# Patient Record
Sex: MALE | Race: WHITE | NOT HISPANIC OR LATINO | Employment: OTHER | URBAN - METROPOLITAN AREA
[De-identification: names, ages, dates, MRNs, and addresses within clinical notes are randomized per-mention and may not be internally consistent; named-entity substitution may affect disease eponyms.]

---

## 2017-02-23 ENCOUNTER — TRANSCRIBE ORDERS (OUTPATIENT)
Dept: ADMINISTRATIVE | Facility: HOSPITAL | Age: 47
End: 2017-02-23

## 2017-02-23 DIAGNOSIS — M25.562 LEFT KNEE PAIN, UNSPECIFIED CHRONICITY: Primary | ICD-10-CM

## 2017-02-28 ENCOUNTER — HOSPITAL ENCOUNTER (OUTPATIENT)
Dept: MRI IMAGING | Facility: HOSPITAL | Age: 47
Discharge: HOME | End: 2017-02-28
Admitting: ORTHOPAEDIC SURGERY

## 2017-02-28 DIAGNOSIS — M25.562 LEFT KNEE PAIN, UNSPECIFIED CHRONICITY: ICD-10-CM

## 2017-02-28 PROCEDURE — 73721 MRI JNT OF LWR EXTRE W/O DYE: CPT

## 2017-09-20 ENCOUNTER — TELEPHONE (OUTPATIENT)
Dept: INTERNAL MEDICINE | Facility: CLINIC | Age: 47
End: 2017-09-20

## 2017-09-20 NOTE — TELEPHONE ENCOUNTER
Since this isn't emergent, can take next available PE appt slot.  Amari Vale MD  1:45 PM  09/20/17

## 2017-09-20 NOTE — TELEPHONE ENCOUNTER
S/W PT, EXPL SINCE APPT IS NOT EMERGENT AND NEED FOR PHYSICAL THAT DR PUENTES RECOMMENDS TAKING NEXT AVAILABLE PHYSICAL APPT.  SONIA GOOD UNDERSTANDING AND AGREEMENT.  APPT SCHEDULED FOR Thursday, 11/2/17 AT 2:15PM WITH DR PUENTES.  SONIA KENNEDY APPREC.

## 2017-09-20 NOTE — TELEPHONE ENCOUNTER
----- Message from Madison Paige sent at 9/20/2017 10:30 AM EDT -----  [phone]    PT WAS LAST SEEN 6/10/2013.  WOULD LIKE TO RE-ESTABLISH AND NEEDS A PHY-PT HAS A MATERNAL FAMILIAL HISTORY OF COLON CANCER-NO SYMPTOMS BUT FEELS LIKE HE PROBABLY NEEDS BASELINE COLONOSCOPY.  CAN YOU WORK IN SOONER THAN FIRST NP OPENING?

## 2017-11-02 ENCOUNTER — OFFICE VISIT (OUTPATIENT)
Dept: INTERNAL MEDICINE | Facility: CLINIC | Age: 47
End: 2017-11-02

## 2017-11-02 VITALS
HEIGHT: 76 IN | TEMPERATURE: 97.7 F | DIASTOLIC BLOOD PRESSURE: 84 MMHG | HEART RATE: 88 BPM | SYSTOLIC BLOOD PRESSURE: 120 MMHG | RESPIRATION RATE: 20 BRPM | BODY MASS INDEX: 28.56 KG/M2 | WEIGHT: 234.5 LBS

## 2017-11-02 DIAGNOSIS — Z00.00 PHYSICAL EXAM: ICD-10-CM

## 2017-11-02 DIAGNOSIS — Z80.0 FAMILY HISTORY OF COLON CANCER: ICD-10-CM

## 2017-11-02 DIAGNOSIS — Z23 IMMUNIZATION DUE: ICD-10-CM

## 2017-11-02 DIAGNOSIS — Z23 NEED FOR IMMUNIZATION AGAINST INFLUENZA: Primary | ICD-10-CM

## 2017-11-02 PROCEDURE — 90471 IMMUNIZATION ADMIN: CPT | Performed by: INTERNAL MEDICINE

## 2017-11-02 PROCEDURE — 99396 PREV VISIT EST AGE 40-64: CPT | Performed by: INTERNAL MEDICINE

## 2017-11-02 PROCEDURE — 90472 IMMUNIZATION ADMIN EACH ADD: CPT | Performed by: INTERNAL MEDICINE

## 2017-11-02 PROCEDURE — 90686 IIV4 VACC NO PRSV 0.5 ML IM: CPT | Performed by: INTERNAL MEDICINE

## 2017-11-02 PROCEDURE — 90715 TDAP VACCINE 7 YRS/> IM: CPT | Performed by: INTERNAL MEDICINE

## 2017-11-02 RX ORDER — VENLAFAXINE HYDROCHLORIDE 150 MG/1
1 CAPSULE, EXTENDED RELEASE ORAL DAILY
Refills: 5 | COMMUNITY
Start: 2017-10-16 | End: 2020-11-30

## 2017-11-02 RX ORDER — LAMOTRIGINE 25 MG/1
2 TABLET ORAL DAILY
COMMUNITY
Start: 2013-05-01 | End: 2020-11-30

## 2017-11-02 RX ORDER — OXCARBAZEPINE 300 MG/1
600 TABLET, FILM COATED ORAL NIGHTLY
Refills: 5 | COMMUNITY
Start: 2017-10-16 | End: 2020-11-30

## 2017-11-02 NOTE — PROGRESS NOTES
"Chief Complaint   Patient presents with   • Annual Exam     non fasting        History of Present Illness    The patient presents for an established patient physical examination and health maintenance visit. The patient has not had a colonoscopy. He has a family history of colon cancer in his maternal grandfather and a history of adenomatous polyps in his mother in her 40s.    Medications      Current Outpatient Prescriptions:   •  lamoTRIgine (LaMICtal) 25 MG tablet, Take 2 tablets by mouth Daily., Disp: , Rfl:   •  OXcarbazepine (TRILEPTAL) 300 MG tablet, 3 tablets Every Night., Disp: , Rfl: 5  •  venlafaxine XR (EFFEXOR-XR) 150 MG 24 hr capsule, 1 capsule Daily., Disp: , Rfl: 5     Allergies    Allergies   Allergen Reactions   • No Known Drug Allergy        Problem List    There is no problem list on file for this patient.      Medications, Allergies, Problems List and Past History were reviewed and updated.    Physical Examination    /84 (BP Location: Left arm)  Pulse 88  Temp 97.7 °F (36.5 °C) (Temporal Artery )   Resp 20  Ht 75.5\" (191.8 cm)  Wt 234 lb 8 oz (106 kg)  BMI 28.92 kg/m2    HEENT: Head- Normocephalic Atraumatic. Facies- Within normal limits. Pinnas- Normal texture and shape bilaterally. Canals- Normal bilaterally. TMs- Normal bilaterally. Nares- Patent bilaterally. Nasal Septum- is normal. There is no tenderness to palpation over the frontal or maxillary sinuses. Lids- Normal bilaterally. Conjunctiva- Clear bilaterally. Sclera- Anicteric bilaterally. Oropharynx- Moist with no lesions. Tonsils- No enlargement, erythema or exudate.    Neck: Thyroid- non enlarged, symmetric and has no nodules. No bruits are detected. ROM- Normal Range of Motion with no rigidity.    Lymph Nodes: Cervical- no enlarged lymph nodes noted. Clavicular- Deferred. Axillary- Deferred. Inguinal- Deferred.    Lungs: Auscultation- Clear to auscultation bilaterally. There are no retractions, clubbing or cyanosis. The " Expiratory to Inspiratory ratio is equal.    Cardiovascular: There are no carotid bruits. Heart- Normal Rate with Regular rhythm and no murmurs. There are no gallops. There are no rubs. In the lower extremities there is no edema. The upper extremities do not have edema.    Abdomen: Soft, benign, non-tender with no masses, hernias, organomegaly or scars.    Male Genitourinary: The penis is circumcised with testicles found in the scrotum bilaterally. Testicular Size is normal bilaterally. The penis has no anatomic abnormalities.    Rectal: reveals normal external sphincter tone with no external lesions.    Prostate: The prostate gland is symmetric and smooth with no nodularity.    The patient has no worrisome or suspicious skin lesions noted.    Impression and Assessment    Normal Physical Examination.    Family History of Colon Cancer.    Plan    Counseling was provided regarding: Dental Visits, Handwashing, Heart Healthy Diet and Seat Belt Utilization.    The following was ordered for screening and health maintenance: Cardiac Calcium CT Scan, Lipid Profile and U/A.    Counseled regarding immunizations and applicable VIS given.    Immunizations Ordered and Administered: Tdap.    Lloyd was seen today for annual exam.    Diagnoses and all orders for this visit:    Need for immunization against influenza  -     Flu Vaccine Quad PF 3YR+    Physical exam  -     Lipid Panel; Future  -     POC Urinalysis Dipstick, Automated; Future  -     CT cardiac calcium score wo dye; Future  -     Ambulatory Referral For Screening Colonoscopy    Immunization due  -     Tdap Vaccine Greater Than or Equal To 6yo IM    Family history of colon cancer  -     Ambulatory Referral For Screening Colonoscopy        Return to Office    The patient was instructed to return for follow-up in 1 year. Next Visit: Physical.    The patient was instructed to return sooner if the condition changes, worsens, or doesn't resolve.

## 2017-11-09 ENCOUNTER — LAB REQUISITION (OUTPATIENT)
Dept: LAB | Facility: HOSPITAL | Age: 47
End: 2017-11-09

## 2017-11-09 ENCOUNTER — OUTSIDE FACILITY SERVICE (OUTPATIENT)
Dept: GASTROENTEROLOGY | Facility: CLINIC | Age: 47
End: 2017-11-09

## 2017-11-09 DIAGNOSIS — Z12.11 ENCOUNTER FOR SCREENING FOR MALIGNANT NEOPLASM OF COLON: ICD-10-CM

## 2017-11-09 PROCEDURE — 88305 TISSUE EXAM BY PATHOLOGIST: CPT | Performed by: INTERNAL MEDICINE

## 2017-11-09 PROCEDURE — G0500 MOD SEDAT ENDO SERVICE >5YRS: HCPCS | Performed by: INTERNAL MEDICINE

## 2017-11-09 PROCEDURE — G0121 COLON CA SCRN NOT HI RSK IND: HCPCS | Performed by: INTERNAL MEDICINE

## 2017-11-10 LAB
CYTO UR: NORMAL
LAB AP CASE REPORT: NORMAL
LAB AP CLINICAL INFORMATION: NORMAL
Lab: NORMAL
PATH REPORT.FINAL DX SPEC: NORMAL
PATH REPORT.GROSS SPEC: NORMAL

## 2017-11-13 ENCOUNTER — TELEPHONE (OUTPATIENT)
Dept: GASTROENTEROLOGY | Facility: CLINIC | Age: 47
End: 2017-11-13

## 2017-11-13 NOTE — TELEPHONE ENCOUNTER
----- Message from Mayur Meza MD sent at 11/13/2017  7:04 AM EST -----  Please call Milton and inform him that the polyp that was removed was an adenoma or precancerous polyp.  Follow-up colonoscopy is indicated in 5 years time.  Dr. Meza

## 2018-01-10 ENCOUNTER — TELEPHONE (OUTPATIENT)
Dept: INTERNAL MEDICINE | Facility: CLINIC | Age: 48
End: 2018-01-10

## 2018-01-10 NOTE — TELEPHONE ENCOUNTER
1-10-18  S/w patient informed him regarding his fasting lab work he gave verbal understanding stated he will come in as soon as he can for that.  Lab hours were given

## 2018-01-11 ENCOUNTER — TELEPHONE (OUTPATIENT)
Dept: INTERNAL MEDICINE | Facility: CLINIC | Age: 48
End: 2018-01-11

## 2018-01-11 NOTE — TELEPHONE ENCOUNTER
----- Message from Bettye De La Vega sent at 1/10/2018  2:57 PM EST -----  This has not been schedule yet ?  ----- Message -----     From: SYSTEM     Sent: 1/10/2018  12:11 AM       To: [facility]

## 2018-01-11 NOTE — TELEPHONE ENCOUNTER
Previously we were unable to contact pt to schedule.  I was able to get ahold of him and gave him number to call back to schedule. At that time he did still want to schedule. I just called him and LVM to see if he was still wanting to get this done

## 2018-06-15 ENCOUNTER — HOSPITAL ENCOUNTER (EMERGENCY)
Facility: HOSPITAL | Age: 48
Discharge: HOME | End: 2018-06-15
Attending: EMERGENCY MEDICINE | Admitting: EMERGENCY MEDICINE

## 2018-06-15 ENCOUNTER — APPOINTMENT (OUTPATIENT)
Dept: CT IMAGING | Facility: HOSPITAL | Age: 48
End: 2018-06-15

## 2018-06-15 ENCOUNTER — APPOINTMENT (OUTPATIENT)
Dept: ULTRASOUND IMAGING | Facility: HOSPITAL | Age: 48
End: 2018-06-15

## 2018-06-15 VITALS
HEART RATE: 86 BPM | TEMPERATURE: 98.6 F | WEIGHT: 240 LBS | RESPIRATION RATE: 16 BRPM | BODY MASS INDEX: 30.8 KG/M2 | DIASTOLIC BLOOD PRESSURE: 100 MMHG | SYSTOLIC BLOOD PRESSURE: 148 MMHG | OXYGEN SATURATION: 97 % | HEIGHT: 74 IN

## 2018-06-15 DIAGNOSIS — R10.9 RIGHT SIDED ABDOMINAL PAIN: Primary | ICD-10-CM

## 2018-06-15 LAB
ALBUMIN SERPL-MCNC: 4.78 G/DL (ref 3.2–4.8)
ALBUMIN/GLOB SERPL: 1.7 G/DL (ref 1.5–2.5)
ALP SERPL-CCNC: 102 U/L (ref 25–100)
ALT SERPL W P-5'-P-CCNC: 28 U/L (ref 7–40)
ANION GAP SERPL CALCULATED.3IONS-SCNC: 6 MMOL/L (ref 3–11)
AST SERPL-CCNC: 20 U/L (ref 0–33)
BASOPHILS # BLD AUTO: 0.03 10*3/MM3 (ref 0–0.2)
BASOPHILS NFR BLD AUTO: 0.4 % (ref 0–1)
BILIRUB SERPL-MCNC: 0.7 MG/DL (ref 0.3–1.2)
BILIRUB UR QL STRIP: NEGATIVE
BUN BLD-MCNC: 21 MG/DL (ref 9–23)
BUN/CREAT SERPL: 20.4 (ref 7–25)
CALCIUM SPEC-SCNC: 9.6 MG/DL (ref 8.7–10.4)
CHLORIDE SERPL-SCNC: 103 MMOL/L (ref 99–109)
CLARITY UR: CLEAR
CO2 SERPL-SCNC: 31 MMOL/L (ref 20–31)
COLOR UR: YELLOW
CREAT BLD-MCNC: 1.03 MG/DL (ref 0.6–1.3)
DEPRECATED RDW RBC AUTO: 39.4 FL (ref 37–54)
EOSINOPHIL # BLD AUTO: 0.15 10*3/MM3 (ref 0–0.3)
EOSINOPHIL NFR BLD AUTO: 1.9 % (ref 0–3)
ERYTHROCYTE [DISTWIDTH] IN BLOOD BY AUTOMATED COUNT: 12.2 % (ref 11.3–14.5)
GFR SERPL CREATININE-BSD FRML MDRD: 78 ML/MIN/1.73
GLOBULIN UR ELPH-MCNC: 2.8 GM/DL
GLUCOSE BLD-MCNC: 107 MG/DL (ref 70–100)
GLUCOSE UR STRIP-MCNC: NEGATIVE MG/DL
HCT VFR BLD AUTO: 44.8 % (ref 38.9–50.9)
HGB BLD-MCNC: 15.6 G/DL (ref 13.1–17.5)
HGB UR QL STRIP.AUTO: NEGATIVE
HOLD SPECIMEN: NORMAL
HOLD SPECIMEN: NORMAL
IMM GRANULOCYTES # BLD: 0.01 10*3/MM3 (ref 0–0.03)
IMM GRANULOCYTES NFR BLD: 0.1 % (ref 0–0.6)
KETONES UR QL STRIP: NEGATIVE
LEUKOCYTE ESTERASE UR QL STRIP.AUTO: NEGATIVE
LIPASE SERPL-CCNC: 41 U/L (ref 6–51)
LYMPHOCYTES # BLD AUTO: 2.45 10*3/MM3 (ref 0.6–4.8)
LYMPHOCYTES NFR BLD AUTO: 30.4 % (ref 24–44)
MCH RBC QN AUTO: 30.4 PG (ref 27–31)
MCHC RBC AUTO-ENTMCNC: 34.8 G/DL (ref 32–36)
MCV RBC AUTO: 87.3 FL (ref 80–99)
MONOCYTES # BLD AUTO: 0.73 10*3/MM3 (ref 0–1)
MONOCYTES NFR BLD AUTO: 9 % (ref 0–12)
NEUTROPHILS # BLD AUTO: 4.7 10*3/MM3 (ref 1.5–8.3)
NEUTROPHILS NFR BLD AUTO: 58.2 % (ref 41–71)
NITRITE UR QL STRIP: NEGATIVE
PH UR STRIP.AUTO: 7.5 [PH] (ref 5–8)
PLATELET # BLD AUTO: 200 10*3/MM3 (ref 150–450)
PMV BLD AUTO: 9.4 FL (ref 6–12)
POTASSIUM BLD-SCNC: 3.9 MMOL/L (ref 3.5–5.5)
PROT SERPL-MCNC: 7.6 G/DL (ref 5.7–8.2)
PROT UR QL STRIP: NEGATIVE
RBC # BLD AUTO: 5.13 10*6/MM3 (ref 4.2–5.76)
SODIUM BLD-SCNC: 140 MMOL/L (ref 132–146)
SP GR UR STRIP: 1.08 (ref 1–1.03)
UROBILINOGEN UR QL STRIP: ABNORMAL
WBC NRBC COR # BLD: 8.07 10*3/MM3 (ref 3.5–10.8)
WHOLE BLOOD HOLD SPECIMEN: NORMAL
WHOLE BLOOD HOLD SPECIMEN: NORMAL

## 2018-06-15 PROCEDURE — 96360 HYDRATION IV INFUSION INIT: CPT

## 2018-06-15 PROCEDURE — 74177 CT ABD & PELVIS W/CONTRAST: CPT

## 2018-06-15 PROCEDURE — 85025 COMPLETE CBC W/AUTO DIFF WBC: CPT

## 2018-06-15 PROCEDURE — 99284 EMERGENCY DEPT VISIT MOD MDM: CPT

## 2018-06-15 PROCEDURE — 76705 ECHO EXAM OF ABDOMEN: CPT

## 2018-06-15 PROCEDURE — 25010000002 IOPAMIDOL 61 % SOLUTION: Performed by: EMERGENCY MEDICINE

## 2018-06-15 PROCEDURE — 80053 COMPREHEN METABOLIC PANEL: CPT

## 2018-06-15 PROCEDURE — 83690 ASSAY OF LIPASE: CPT

## 2018-06-15 PROCEDURE — 81003 URINALYSIS AUTO W/O SCOPE: CPT

## 2018-06-15 RX ORDER — DICYCLOMINE HCL 20 MG
20 TABLET ORAL EVERY 6 HOURS PRN
Qty: 20 TABLET | Refills: 0 | Status: SHIPPED | OUTPATIENT
Start: 2018-06-15 | End: 2019-09-03

## 2018-06-15 RX ORDER — SODIUM CHLORIDE 0.9 % (FLUSH) 0.9 %
10 SYRINGE (ML) INJECTION AS NEEDED
Status: DISCONTINUED | OUTPATIENT
Start: 2018-06-15 | End: 2018-06-15 | Stop reason: HOSPADM

## 2018-06-15 RX ADMIN — SODIUM CHLORIDE 1000 ML: 9 INJECTION, SOLUTION INTRAVENOUS at 09:11

## 2018-06-15 RX ADMIN — IOPAMIDOL 95 ML: 612 INJECTION, SOLUTION INTRAVENOUS at 09:24

## 2018-06-15 NOTE — ED PROVIDER NOTES
Subjective   Lloyd Yanez is a 47 y.o.male who presents to the ED with complaints of abdominal pain. The pt reports his pain started in his RLQ one week ago. He states it started as intermittent but has now developed into a constant pain within the last day. He claims he has been losing sleep at night due to the pain. He takes medicine for depression. He states he originally did not experience any nausea, but today he has felt nauseous. He also complains of coughing, decreased appetite, and diarrhea. He denies any dysuria or vomiting. He does not have any history of abdominal problems but he does have family history of a cholecystectomy. There are no other complaints at this time.        History provided by:  Patient  Abdominal Pain   Pain location:  RLQ  Pain quality: aching    Pain radiates to:  Does not radiate  Pain severity:  Moderate  Onset quality:  Sudden  Duration:  1 week  Timing:  Constant  Progression:  Worsening  Chronicity:  New  Context: awakening from sleep    Relieved by:  None tried  Worsened by:  Coughing  Ineffective treatments:  None tried  Associated symptoms: cough, diarrhea and nausea    Associated symptoms: no dysuria and no vomiting        Review of Systems   Constitutional: Positive for appetite change (decreased).   Respiratory: Positive for cough.    Gastrointestinal: Positive for abdominal pain (RLQ), diarrhea and nausea. Negative for vomiting.   Genitourinary: Negative for dysuria.   All other systems reviewed and are negative.      Past Medical History:   Diagnosis Date   • Depression        Allergies   Allergen Reactions   • No Known Drug Allergy        Past Surgical History:   Procedure Laterality Date   • BACK SURGERY     • TONSILLECTOMY     • UVULOPALATOPHARYNGOPLASTY         History reviewed. No pertinent family history.    Social History     Social History   • Marital status:      Social History Main Topics   • Smoking status: Never Smoker   • Smokeless tobacco: Never  Used   • Alcohol use 2.4 oz/week     4 Cans of beer per week   • Drug use: No   • Sexual activity: Defer     Other Topics Concern   • Not on file         Objective   Physical Exam   Constitutional: He is oriented to person, place, and time. He appears well-developed and well-nourished. No distress.   HENT:   Head: Normocephalic and atraumatic.   Nose: Nose normal.   Eyes: Conjunctivae are normal. No scleral icterus.   Neck: Normal range of motion. Neck supple.   Cardiovascular: Normal rate, regular rhythm and normal heart sounds.    No murmur heard.  Pulmonary/Chest: Effort normal and breath sounds normal. No respiratory distress.   Abdominal: Soft. Bowel sounds are normal. There is tenderness (mild RLQ tenderness). There is no rebound and no guarding.   Rovsing's sign present   Musculoskeletal: Normal range of motion. He exhibits no edema.   Neurological: He is alert and oriented to person, place, and time.   Skin: Skin is warm and dry.   Psychiatric: He has a normal mood and affect. His behavior is normal.   Nursing note and vitals reviewed.      Procedures         ED Course     Labs, UA, CT, US negative.  Patient stable on serial rechecks.  Discussed findings, concerns, plan of care, expected course, reasons to return and followup.                  MDM  Number of Diagnoses or Management Options  Right sided abdominal pain:      Amount and/or Complexity of Data Reviewed  Clinical lab tests: ordered and reviewed  Tests in the radiology section of CPT®: ordered and reviewed  Independent visualization of images, tracings, or specimens: yes        Final diagnoses:   Right sided abdominal pain       Documentation assistance provided by jarrod Herrera.  Information recorded by the jarrod was done at my direction and has been verified and validated by me.     Kendrick Herrera  06/15/18 0928       Kendrick Herrera  06/15/18 1212       Eleazar Bledsoe MD  06/15/18 0693

## 2018-12-13 ENCOUNTER — TRANSCRIBE ORDERS (OUTPATIENT)
Dept: ADMINISTRATIVE | Facility: HOSPITAL | Age: 48
End: 2018-12-13

## 2018-12-13 DIAGNOSIS — M25.562 LEFT KNEE PAIN, UNSPECIFIED CHRONICITY: Primary | ICD-10-CM

## 2018-12-17 ENCOUNTER — HOSPITAL ENCOUNTER (OUTPATIENT)
Dept: MRI IMAGING | Facility: HOSPITAL | Age: 48
Discharge: HOME | End: 2018-12-17
Admitting: ORTHOPAEDIC SURGERY

## 2018-12-17 DIAGNOSIS — M25.562 LEFT KNEE PAIN, UNSPECIFIED CHRONICITY: ICD-10-CM

## 2018-12-17 PROCEDURE — 73721 MRI JNT OF LWR EXTRE W/O DYE: CPT

## 2019-08-19 PROCEDURE — 85025 COMPLETE CBC W/AUTO DIFF WBC: CPT | Performed by: FAMILY MEDICINE

## 2019-08-19 PROCEDURE — 87086 URINE CULTURE/COLONY COUNT: CPT | Performed by: FAMILY MEDICINE

## 2019-08-20 ENCOUNTER — TELEPHONE (OUTPATIENT)
Dept: URGENT CARE | Facility: CLINIC | Age: 49
End: 2019-08-20

## 2019-08-21 ENCOUNTER — TELEPHONE (OUTPATIENT)
Dept: URGENT CARE | Facility: CLINIC | Age: 49
End: 2019-08-21

## 2019-08-23 ENCOUNTER — TELEPHONE (OUTPATIENT)
Dept: URGENT CARE | Facility: CLINIC | Age: 49
End: 2019-08-23

## 2019-08-28 ENCOUNTER — TELEPHONE (OUTPATIENT)
Dept: INTERNAL MEDICINE | Facility: CLINIC | Age: 49
End: 2019-08-28

## 2019-08-28 NOTE — TELEPHONE ENCOUNTER
Lloyd called and would a return call to discuss having an MRI done and seeing Neuro. He has several questions about having the MRI done and the need for a referral to Neuro.     [phone]    Thank you.

## 2019-09-03 ENCOUNTER — OFFICE VISIT (OUTPATIENT)
Dept: INTERNAL MEDICINE | Facility: CLINIC | Age: 49
End: 2019-09-03

## 2019-09-03 VITALS
WEIGHT: 239 LBS | BODY MASS INDEX: 29.87 KG/M2 | DIASTOLIC BLOOD PRESSURE: 68 MMHG | RESPIRATION RATE: 16 BRPM | HEART RATE: 84 BPM | TEMPERATURE: 98.3 F | SYSTOLIC BLOOD PRESSURE: 124 MMHG

## 2019-09-03 DIAGNOSIS — G89.29 CHRONIC MIDLINE LOW BACK PAIN WITHOUT SCIATICA: Primary | ICD-10-CM

## 2019-09-03 DIAGNOSIS — M54.50 CHRONIC MIDLINE LOW BACK PAIN WITHOUT SCIATICA: Primary | ICD-10-CM

## 2019-09-03 PROCEDURE — 99213 OFFICE O/P EST LOW 20 MIN: CPT | Performed by: INTERNAL MEDICINE

## 2019-09-03 NOTE — PROGRESS NOTES
Chief Complaint   Patient presents with   • Discuss back slippage     30 years s/p fusion T4-L1       History of Present Illness    The patient presents for an acute visit for low back pain which began two months ago. He states that the pain was first noted to come on insidiously. The pain is not associated with fever, chills, weight loss, rashes, dysuria, hematuria or a decreased urine stream. The patient denies a past history of malignancy. The pain does not radiate. There is no numbness. The patient denies loss of bladder control. The patient denies loss of bowel control. The patient has attempted no therapy to relieve the symptoms. The patient reports bending and walking aggravates the symptoms. He has a history of a T3-L1 fusion about 30 years ago.    Review of Systems    CONSTITUTIONAL- Denies Sweats, Fatigue, Weakness or Malaise.    HENT- Denies Nasal Discharge, Sore Throat, Ear Pain, Ear Drainage, Headache, Sinus Pain, Nasal Congestion, Decreased Hearing or Tinnitus.    MUSCULOSKELETAL- Reports: Decreased Range of Motion. Denies: Joint Pain, Joint Stiffness, Joint Swelling or Erythema of Joints.    Medications      Current Outpatient Medications:   •  lamoTRIgine (LaMICtal) 25 MG tablet, Take 2 tablets by mouth Daily., Disp: , Rfl:   •  OXcarbazepine (TRILEPTAL) 300 MG tablet, 600 mg Every Night., Disp: , Rfl: 5  •  venlafaxine XR (EFFEXOR-XR) 150 MG 24 hr capsule, 1 capsule Daily., Disp: , Rfl: 5     Allergies    Allergies   Allergen Reactions   • No Known Drug Allergy        Problem List    There is no problem list on file for this patient.      Medications, Allergies, Problems List and Past History were reviewed and updated.    Physical Examination    /68 (BP Location: Left arm, Patient Position: Sitting, Cuff Size: Adult)   Pulse 84   Temp 98.3 °F (36.8 °C) (Temporal)   Resp 16   Wt 108 kg (239 lb)   BMI 29.87 kg/m²     Abdomen: Soft, benign, non-tender with no masses, hernias, organomegaly or  scars.    Back: The patient has a normal spinal curvature with no evidence of scoliosis. There are no skin lesions noted on the back. Palpation reveals no tenderness and normal muscle tone. The straight leg raising test is negative. The back has normal flexion, extension, rotation, and lateral bending.    Lower Extremity Neuro Exam: Muscle Strength is 5/5 in all major lower extremity muscle groups. Deep Tendon Reflexes are 2+ and equal in the patellar and Achilles distributions bilaterally. Sensation is normal in all distributions.    Impression and Assessment    Low Back Pain.    Plan    Low Back Pain Plan: He was referred to physical therapy. Will obtain X-ray and if normal and no relief with PT, will need MRI.    Lloyd was seen today for discuss back slippage.    Diagnoses and all orders for this visit:    Chronic midline low back pain without sciatica  -     Ambulatory Referral to Physical Therapy Evaluate and treat  -     XR Spine Lumbar 2 or 3 View; Future        Return to Office    The patient was instructed to return for follow-up in 1 month.    The patient was instructed to return sooner if the condition changes, worsens, or does not resolve.

## 2019-09-25 ENCOUNTER — TELEPHONE (OUTPATIENT)
Dept: INTERNAL MEDICINE | Facility: CLINIC | Age: 49
End: 2019-09-25

## 2019-10-02 NOTE — TELEPHONE ENCOUNTER
Patient never did return phone call and I seen he had an appointment with you yesterday and canceled.  Do you want to d/c order of the x-ray or me send patient a letter?

## 2020-07-06 ENCOUNTER — OFFICE VISIT (OUTPATIENT)
Dept: ORTHOPEDIC SURGERY | Facility: CLINIC | Age: 50
End: 2020-07-06

## 2020-07-06 VITALS — OXYGEN SATURATION: 98 % | HEART RATE: 108 BPM | BODY MASS INDEX: 29.88 KG/M2 | HEIGHT: 75 IN | WEIGHT: 240.3 LBS
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
## 2020-07-06 DIAGNOSIS — M79.674 TOE PAIN, RIGHT: Primary | ICD-10-CM

## 2020-07-06 DIAGNOSIS — S92.411A CLOSED DISPLACED FRACTURE OF PROXIMAL PHALANX OF RIGHT GREAT TOE, INITIAL ENCOUNTER: ICD-10-CM

## 2020-07-06 PROCEDURE — 28490 TREAT BIG TOE FRACTURE: CPT | Performed by: PHYSICIAN ASSISTANT

## 2020-07-06 PROCEDURE — 99214 OFFICE O/P EST MOD 30 MIN: CPT | Performed by: PHYSICIAN ASSISTANT

## 2020-07-06 NOTE — PROGRESS NOTES
[facility] Clinic Note    Subjective     Chief Complaint   Patient presents with   • Right Foot - Pain     [facility] follow up 6/30/20-Nondisplaced fracture of proximal phalanx of right great toe   DOI: 6/30/2020    [facility]  Lloyd Yanez is a 49 y.o. male.  Patient presents for evaluation of his right great toe.  He was kicking a wooden board in [facility].  Toe struck the board he had instant pain to his great toe.  He was seen at urgent care on the same day and diagnosed with a proximal phalanx fracture of the great toe.  Was placed in a hard soled shoe and referred to orthopedics for further evaluation and treatment.    Currently he endorses a pain scale of 3/10.  Severity the pain moderate.  Quality pain aching, burning, throbbing.  Associated symptoms swelling.  Symptoms are worse with walking and standing.  No reported numbness or tingling into the extremity.    Patient reports he has had multiple other injuries to this toe and understands that he has arthritic changes already to the toe.  Patient is not a diabetic nor does he smoke.    Patient denies any fever, chills, night sweats or other constitutional symptoms.    Past Medical History:   Diagnosis Date   • Depression       Past Surgical History:   Procedure Laterality Date   • BACK SURGERY     • TONSILLECTOMY     • UVULOPALATOPHARYNGOPLASTY        History reviewed. No pertinent family history.  Social History     Socioeconomic History   • Marital status:      Spouse name: Not on file   • Number of children: Not on file   • Years of education: Not on file   • Highest education level: Not on file   Tobacco Use   • Smoking status: Never Smoker   • Smokeless tobacco: Never Used   Substance and Sexual Activity   • Alcohol use: Yes     Alcohol/week: 4.0 standard drinks     Types: 4 Cans of beer per week   • Drug use: No   • Sexual activity: Defer      Current Outpatient Medications on File Prior to Visit   Medication Sig Dispense Refill   •  "fluocinonide (LIDEX) 0.05 % gel      • lamoTRIgine (LaMICtal) 25 MG tablet Take 2 tablets by mouth Daily.     • OXcarbazepine (TRILEPTAL) 300 MG tablet 600 mg Every Night.  5   • venlafaxine XR (EFFEXOR-XR) 150 MG 24 hr capsule 1 capsule Daily.  5     No current facility-administered medications on file prior to visit.       Allergies   Allergen Reactions   • No Known Drug Allergy         The following portions of the patient's history were reviewed and updated as appropriate: allergies, current medications, past family history, past medical history, past social history, past surgical history and problem list.    Review of Systems   Constitutional: Negative.    HENT: Negative.    Eyes: Negative.    Respiratory: Negative.    Cardiovascular: Negative.    Gastrointestinal: Negative.    Endocrine: Negative.    Genitourinary: Negative.    Musculoskeletal: Positive for arthralgias.   Skin: Negative.    Allergic/Immunologic: Negative.    Neurological: Negative.    Hematological: Negative.    Psychiatric/Behavioral: Negative.         Objective      Physical Exam  Pulse 108   Ht 190.5 cm (75\")   Wt 109 kg (240 lb 4.8 oz)   SpO2 98%   BMI 30.04 kg/m²     Body mass index is 30.04 kg/m².    GENERAL APPEARANCE: awake, alert & oriented x 3, in no acute distress and well developed, well nourished  PSYCH: normal mood and affect  LUNGS:  breathing nonlabored, no wheezing  EYES: sclera anicteric, pupils equal  CARDIOVASCULAR: palpable pulses dorsalis pedis, palpable posterior tibial bilaterally. Capillary refill less than 2 seconds  INTEGUMENTARY: skin intact, no clubbing, cyanosis  NEUROLOGIC:  Normal Sensation         Ortho Exam  Right foot/great toe  Skin: Grossly intact without any redness or warmth.  Moderate soft tissue swelling noted throughout the toe with swelling also noted into foot.     Tenderness: Positive tenderness all along great toe especially at IPJ.  He also has pain noted at the MTP.  Motion: Dorsiflexion " plantarflexion of the ankle are grossly intact.  Patient is able to wiggle the toes.  Motor: Grossly intact TA/GS/EHL/FHL/P  Sensory: Grossly intact to light touch L2-S1.  Vascular: 2+ DP/PT with brisk capillary refill into each toe.      Imaging/Studies  Ordered right great toe plain films.  Imaging read by Dr. Short.    Imaging Results (Last 7 Days)     Procedure Component Value Units Date/Time    XR Toe 2+ View Right [[id number]] Resulted:  07/06/20 1403     Updated:  07/06/20 1405    Narrative:       Right toe radiographs  Indication: Follow-up right great toe proximal phalanx fracture  Views: AP, lateral and oblique views of the right foot    Comparison: 6/30/2020    Findings:  Displaced fracture involving the distal aspect of the proximal phalanx,   with the displacement best seen on the lateral view, with what appears to   be an old fragment at the base of the proximal phalanx abutting with an   osteophyte at the metatarsal head of the great toe, with another fragment   at the base of the distal phalanx dorsally, which may be old or new.          Assessment/Plan        ICD-10-CM ICD-9-CM   1. Toe pain, right M79.674 729.5   2. Closed displaced fracture of proximal phalanx of right great toe, initial encounter S92.411A 826.0   3. Injury while engaged in martial arts Y93.75 E008.4       Orders Placed This Encounter   Procedures   • XR Toe 2+ View Right        -Right great toe pain due to displaced fracture noted proximal phalanx secondary to karate.  -Because of the displacement noted case was discussed with Dr. Freeman who agreed that no surgical intervention is warranted at this time.    -Continue with hard soled postop shoe.  -Patient to continue with over-the-counter pain medications as needed.  -Ice and elevation for swelling control.  -Follow-up 3 weeks for repeat evaluation to include repeat imaging of the right great toe.  -Questions and concerns answered.    Patient was also examined by Dr. Short and he  agrees with the above assessment and plan.    Medical Decision Making  Management Options : over-the-counter medicine and close treatment of fracture or dislocation  Data/Risk: radiology tests       Griselda Elam PA-C  07/07/20  11:54         EMR Dragon/Transcription disclaimer:  Much of this encounter note is an electronic transcription of spoken language to printed text. Electronic transcription of spoken language may permit erroneous, or at times, nonsensical words or phrases to be inadvertently transcribed. Although I have reviewed the note for such errors, some may still exist.

## 2020-07-27 ENCOUNTER — OFFICE VISIT (OUTPATIENT)
Dept: ORTHOPEDIC SURGERY | Facility: CLINIC | Age: 50
End: 2020-07-27

## 2020-07-27 VITALS — HEART RATE: 113 BPM | HEIGHT: 75 IN | WEIGHT: 240.3 LBS | BODY MASS INDEX: 29.88 KG/M2 | OXYGEN SATURATION: 97 %

## 2020-07-27 DIAGNOSIS — S92.411D CLOSED DISPLACED FRACTURE OF PROXIMAL PHALANX OF RIGHT GREAT TOE WITH ROUTINE HEALING, SUBSEQUENT ENCOUNTER: Primary | ICD-10-CM

## 2020-07-27 PROCEDURE — 99024 POSTOP FOLLOW-UP VISIT: CPT | Performed by: PHYSICIAN ASSISTANT

## 2020-07-27 NOTE — PROGRESS NOTES
[facility] [place] [facility] Note    Subjective     Chief Complaint   Patient presents with   • Follow-up     3 week recheck - Closed displaced fracture of proximal phalanx of right great toe-    DOI: 6/30/2020        HPI  Lloyd Yanez is a 49 y.o. male.  Patient returns today for follow-up right great toe fracture.  Is been wearing hard soled postop shoe since his last evaluation.  He continues to have pain to the toe with pain scale of 3/10.  Still notes swelling.  Symptoms are worse with walking and standing.  No reported numbness or tingling although occasionally does feel a burning sensation.    Patient denies any fever, chills, night sweats or other constitutional symptoms    Past Medical History:   Diagnosis Date   • Depression       Past Surgical History:   Procedure Laterality Date   • BACK SURGERY     • TONSILLECTOMY     • UVULOPALATOPHARYNGOPLASTY        History reviewed. No pertinent family history.  Social History     Socioeconomic History   • Marital status:      Spouse name: Not on file   • Number of children: Not on file   • Years of education: Not on file   • Highest education level: Not on file   Tobacco Use   • Smoking status: Never Smoker   • Smokeless tobacco: Never Used   Substance and Sexual Activity   • Alcohol use: Yes     Alcohol/week: 4.0 standard drinks     Types: 4 Cans of beer per week   • Drug use: No   • Sexual activity: Defer      Current Outpatient Medications on File Prior to Visit   Medication Sig Dispense Refill   • fluocinonide (LIDEX) 0.05 % gel      • lamoTRIgine (LaMICtal) 25 MG tablet Take 2 tablets by mouth Daily.     • OXcarbazepine (TRILEPTAL) 300 MG tablet 600 mg Every Night.  5   • venlafaxine XR (EFFEXOR-XR) 150 MG 24 hr capsule 1 capsule Daily.  5     No current facility-administered medications on file prior to visit.       Allergies   Allergen Reactions   • No Known Drug Allergy         The following portions of the patient's history were reviewed and  "updated as appropriate: allergies, current medications, past family history, past medical history, past social history, past surgical history and problem list.    Review of Systems   Constitutional: Negative.    HENT: Negative.    Eyes: Negative.    Respiratory: Negative.    Cardiovascular: Negative.    Gastrointestinal: Negative.    Endocrine: Negative.    Genitourinary: Negative.    Musculoskeletal: Positive for arthralgias and joint swelling.   Skin: Negative.    Allergic/Immunologic: Negative.    Neurological: Negative.    Hematological: Negative.    Psychiatric/Behavioral: Negative.         Objective      Physical Exam  Pulse 113   Ht 190.5 cm (75\")   Wt 109 kg (240 lb 4.8 oz)   SpO2 97%   BMI 30.04 kg/m²     Body mass index is 30.04 kg/m².    GENERAL APPEARANCE: awake, alert & oriented x 3, in no acute distress and well developed, well nourished        Ortho Exam  Right great toe  Skin: Grossly intact without any redness or warmth.    Patient continues to have soft tissue swelling noted throughout the great toe.  Swelling that was previously noted in the foot has resolved.    Tenderness: Continued positive tenderness great toe.  No other tenderness throughout foot.  Motion: Dorsiflexion and plantarflexion of the ankle are grossly intact.    Patient is able to demonstrate flexion extension of the great toe as well as lesser toes.  Motor/sensory: Grossly intact L2-S1.  Vascular: 2+ DP/PT.      Imaging/Studies  Ordered right great toe plain films.  Imaging read by Dr. Short.    Right Toe Radiographs  Indication: Fracture, right great toe proximal phalanx, follow-up  Views: AP, lateral and oblique views of the right foot     Comparison: 7/6/2020     Findings:  Intra-articular fracture of the distal end of the proximal phalanx is unchanged compared the previous films, with dorsal displacement, but no change in the articular surface.    Assessment/Plan        ICD-10-CM ICD-9-CM   1. Closed displaced fracture of " proximal phalanx of right great toe with routine healing, subsequent encounter S92.411D V54.19       No orders of the defined types were placed in this encounter.       -Displaced fracture proximal phalanx right great toe, stable when compared to prior films from 7/6/2020.  Evidence of healing noted.  -Patient will continue with hard soled postop shoe in the next 2 to 4 weeks transition to a regular shoe as able.  -Continue with over-the-counter pain medication as needed.  -Continue with elevation for swelling control.  -Follow-up 4 weeks for repeat evaluation which will include pre-clinic imaging of the right great toe.  -Questions and concerns answered.    Case discussed with Dr. Short who agrees with the above assessment and plan.      Griselda Elam PA-C  07/27/20  12:43         EMR Dragon/Transcription disclaimer:  Much of this encounter note is an electronic transcription of spoken language to printed text. Electronic transcription of spoken language may permit erroneous, or at times, nonsensical words or phrases to be inadvertently transcribed. Although I have reviewed the note for such errors, some may still exist.

## 2020-08-31 ENCOUNTER — OFFICE VISIT (OUTPATIENT)
Dept: ORTHOPEDIC SURGERY | Facility: CLINIC | Age: 50
End: 2020-08-31

## 2020-08-31 VITALS — HEART RATE: 98 BPM | HEIGHT: 75 IN | WEIGHT: 240.3 LBS | OXYGEN SATURATION: 98 % | BODY MASS INDEX: 29.88 KG/M2

## 2020-08-31 DIAGNOSIS — S92.411D CLOSED DISPLACED FRACTURE OF PROXIMAL PHALANX OF RIGHT GREAT TOE WITH ROUTINE HEALING, SUBSEQUENT ENCOUNTER: Primary | ICD-10-CM

## 2020-08-31 PROCEDURE — 99024 POSTOP FOLLOW-UP VISIT: CPT | Performed by: PHYSICIAN ASSISTANT

## 2020-09-21 ENCOUNTER — HOSPITAL ENCOUNTER (EMERGENCY)
Facility: HOSPITAL | Age: 50
Discharge: HOME | End: 2020-09-21
Attending: EMERGENCY MEDICINE | Admitting: EMERGENCY MEDICINE

## 2020-09-21 ENCOUNTER — APPOINTMENT (OUTPATIENT)
Dept: CT IMAGING | Facility: HOSPITAL | Age: 50
End: 2020-09-21

## 2020-09-21 VITALS
RESPIRATION RATE: 20 BRPM | OXYGEN SATURATION: 97 % | BODY MASS INDEX: 29.84 KG/M2 | TEMPERATURE: 97.5 F | DIASTOLIC BLOOD PRESSURE: 93 MMHG | HEIGHT: 75 IN | HEART RATE: 90 BPM | WEIGHT: 240 LBS | SYSTOLIC BLOOD PRESSURE: 174 MMHG
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
## 2020-09-21 DIAGNOSIS — S20.211A CONTUSION OF RIGHT CHEST WALL, INITIAL ENCOUNTER: ICD-10-CM

## 2020-09-21 DIAGNOSIS — M54.9 MID BACK PAIN: ICD-10-CM

## 2020-09-21 LAB
ALBUMIN SERPL-MCNC: 4.8 G/DL (ref 3.5–5.2)
ALBUMIN/GLOB SERPL: 2.2 G/DL
ALP SERPL-CCNC: 104 U/L (ref 39–117)
ALT SERPL W P-5'-P-CCNC: 28 U/L (ref 1–41)
ANION GAP SERPL CALCULATED.3IONS-SCNC: 6 MMOL/L (ref 5–15)
AST SERPL-CCNC: 22 U/L (ref 1–40)
BASOPHILS # BLD AUTO: 0.06 10*3/MM3 (ref 0–0.2)
BASOPHILS NFR BLD AUTO: 0.8 % (ref 0–1.5)
BILIRUB SERPL-MCNC: 0.4 MG/DL (ref 0–1.2)
BILIRUB UR QL STRIP: NEGATIVE
BUN SERPL-MCNC: 20 MG/DL (ref 6–20)
BUN/CREAT SERPL: 19 (ref 7–25)
CALCIUM SPEC-SCNC: 9.3 MG/DL (ref 8.6–10.5)
CHLORIDE SERPL-SCNC: 102 MMOL/L (ref 98–107)
CLARITY UR: CLEAR
CO2 SERPL-SCNC: 31 MMOL/L (ref 22–29)
COLOR UR: YELLOW
CREAT SERPL-MCNC: 1.05 MG/DL (ref 0.76–1.27)
DEPRECATED RDW RBC AUTO: 38 FL (ref 37–54)
EOSINOPHIL # BLD AUTO: 0.18 10*3/MM3 (ref 0–0.4)
EOSINOPHIL NFR BLD AUTO: 2.5 % (ref 0.3–6.2)
ERYTHROCYTE [DISTWIDTH] IN BLOOD BY AUTOMATED COUNT: 11.9 % (ref 12.3–15.4)
GFR SERPL CREATININE-BSD FRML MDRD: 75 ML/MIN/1.73
GLOBULIN UR ELPH-MCNC: 2.2 GM/DL
GLUCOSE SERPL-MCNC: 89 MG/DL (ref 65–99)
GLUCOSE UR STRIP-MCNC: NEGATIVE MG/DL
HCT VFR BLD AUTO: 45.6 % (ref 37.5–51)
HGB BLD-MCNC: 15.6 G/DL (ref 13–17.7)
HGB UR QL STRIP.AUTO: NEGATIVE
IMM GRANULOCYTES # BLD AUTO: 0.03 10*3/MM3 (ref 0–0.05)
IMM GRANULOCYTES NFR BLD AUTO: 0.4 % (ref 0–0.5)
KETONES UR QL STRIP: NEGATIVE
LEUKOCYTE ESTERASE UR QL STRIP.AUTO: NEGATIVE
LYMPHOCYTES # BLD AUTO: 2.01 10*3/MM3 (ref 0.7–3.1)
LYMPHOCYTES NFR BLD AUTO: 27.8 % (ref 19.6–45.3)
MCH RBC QN AUTO: 30.1 PG (ref 26.6–33)
MCHC RBC AUTO-ENTMCNC: 34.2 G/DL (ref 31.5–35.7)
MCV RBC AUTO: 87.9 FL (ref 79–97)
MONOCYTES # BLD AUTO: 0.69 10*3/MM3 (ref 0.1–0.9)
MONOCYTES NFR BLD AUTO: 9.5 % (ref 5–12)
NEUTROPHILS NFR BLD AUTO: 4.26 10*3/MM3 (ref 1.7–7)
NEUTROPHILS NFR BLD AUTO: 59 % (ref 42.7–76)
NITRITE UR QL STRIP: NEGATIVE
NRBC BLD AUTO-RTO: 0 /100 WBC (ref 0–0.2)
PH UR STRIP.AUTO: 7.5 [PH] (ref 5–8)
PLATELET # BLD AUTO: 244 10*3/MM3 (ref 140–450)
PMV BLD AUTO: 9.6 FL (ref 6–12)
POTASSIUM SERPL-SCNC: 4.5 MMOL/L (ref 3.5–5.2)
PROT SERPL-MCNC: 7 G/DL (ref 6–8.5)
PROT UR QL STRIP: NEGATIVE
RBC # BLD AUTO: 5.19 10*6/MM3 (ref 4.14–5.8)
SODIUM SERPL-SCNC: 139 MMOL/L (ref 136–145)
SP GR UR STRIP: 1.02 (ref 1–1.03)
UROBILINOGEN UR QL STRIP: NORMAL
WBC # BLD AUTO: 7.23 10*3/MM3 (ref 3.4–10.8)

## 2020-09-21 PROCEDURE — 25010000002 IOPAMIDOL 61 % SOLUTION: Performed by: EMERGENCY MEDICINE

## 2020-09-21 PROCEDURE — 72128 CT CHEST SPINE W/O DYE: CPT

## 2020-09-21 PROCEDURE — 96374 THER/PROPH/DIAG INJ IV PUSH: CPT

## 2020-09-21 PROCEDURE — 81003 URINALYSIS AUTO W/O SCOPE: CPT | Performed by: PHYSICIAN ASSISTANT

## 2020-09-21 PROCEDURE — 99283 EMERGENCY DEPT VISIT LOW MDM: CPT

## 2020-09-21 PROCEDURE — 85025 COMPLETE CBC W/AUTO DIFF WBC: CPT | Performed by: PHYSICIAN ASSISTANT

## 2020-09-21 PROCEDURE — 25010000002 KETOROLAC TROMETHAMINE PER 15 MG: Performed by: PHYSICIAN ASSISTANT

## 2020-09-21 PROCEDURE — 80053 COMPREHEN METABOLIC PANEL: CPT | Performed by: PHYSICIAN ASSISTANT

## 2020-09-21 PROCEDURE — 71260 CT THORAX DX C+: CPT

## 2020-09-21 RX ORDER — ONDANSETRON 2 MG/ML
4 INJECTION INTRAMUSCULAR; INTRAVENOUS ONCE
Status: DISCONTINUED | OUTPATIENT
Start: 2020-09-21 | End: 2020-09-21 | Stop reason: HOSPADM

## 2020-09-21 RX ORDER — SODIUM CHLORIDE 0.9 % (FLUSH) 0.9 %
10 SYRINGE (ML) INJECTION AS NEEDED
Status: DISCONTINUED | OUTPATIENT
Start: 2020-09-21 | End: 2020-09-21 | Stop reason: HOSPADM

## 2020-09-21 RX ORDER — KETOROLAC TROMETHAMINE 15 MG/ML
15 INJECTION, SOLUTION INTRAMUSCULAR; INTRAVENOUS ONCE
Status: COMPLETED | OUTPATIENT
Start: 2020-09-21 | End: 2020-09-21

## 2020-09-21 RX ORDER — SODIUM CHLORIDE 9 MG/ML
125 INJECTION, SOLUTION INTRAVENOUS CONTINUOUS
Status: DISCONTINUED | OUTPATIENT
Start: 2020-09-21 | End: 2020-09-21 | Stop reason: HOSPADM

## 2020-09-21 RX ORDER — KETOROLAC TROMETHAMINE 10 MG/1
10 TABLET, FILM COATED ORAL EVERY 6 HOURS PRN
Qty: 24 TABLET | Refills: 0 | Status: SHIPPED | OUTPATIENT
Start: 2020-09-21 | End: 2020-11-02

## 2020-09-21 RX ADMIN — IOPAMIDOL 95 ML: 612 INJECTION, SOLUTION INTRAVENOUS at 15:10

## 2020-09-21 RX ADMIN — KETOROLAC TROMETHAMINE 15 MG: 15 INJECTION, SOLUTION INTRAMUSCULAR; INTRAVENOUS at 16:18

## 2020-11-02 ENCOUNTER — OFFICE VISIT (OUTPATIENT)
Dept: ORTHOPEDIC SURGERY | Facility: CLINIC | Age: 50
End: 2020-11-02

## 2020-11-02 VITALS — OXYGEN SATURATION: 97 % | BODY MASS INDEX: 30.09 KG/M2 | HEART RATE: 96 BPM | WEIGHT: 242 LBS | HEIGHT: 75 IN

## 2020-11-02 DIAGNOSIS — S92.411D CLOSED DISPLACED FRACTURE OF PROXIMAL PHALANX OF RIGHT GREAT TOE WITH ROUTINE HEALING, SUBSEQUENT ENCOUNTER: ICD-10-CM

## 2020-11-02 DIAGNOSIS — Z09 FRACTURE FOLLOW-UP: Primary | ICD-10-CM

## 2020-11-02 PROCEDURE — 99212 OFFICE O/P EST SF 10 MIN: CPT | Performed by: PHYSICIAN ASSISTANT

## 2020-11-02 NOTE — PROGRESS NOTES
"    [facility] [place] [facility] Note        Subjective     CC: Follow-up (2 month follow up -Closed displaced fracture of proximal phalanx of right great toe with routine healing, 4 months from DOI: 6/30/2020)      KHUSHI Yanez is a 50 y.o. male.  Patient returns today for follow-up right great toe fracture he is approximately 4 months out from date of injury.  No new complaints or issues.  He still notes some mild discomfort to the toe as well as stiffness and swelling.  Pain scale remains 0-1/10 depending on activity.    Overall, patient's symptoms are improved, stable.    ROS:    Constiutional:Pt denies fever, chills, nausea, or vomiting.  MSK:as above        Objective      Past Medical History  Past Medical History:   Diagnosis Date   • Depression          Physical Exam  Pulse 96   Ht 190.5 cm (75\")   Wt 110 kg (242 lb)   SpO2 97%   BMI 30.25 kg/m²     Body mass index is 30.25 kg/m².    Patient is well nourished and well developed.        Ortho Exam  Right great toe  Skin: Grossly intact without any redness or warmth.  Patient still has swelling noted to the great toe.    Tenderness: Palpable discomfort noted throughout the toe.  Motion: Able to demonstrate flexion/extension of the great toe with evidence of stiffness.  Motor/sensory: Grossly intact L2-S1.  Vascular: 2+ DP/PT.       Imaging/Labs/EMG Reviewed:  Ordered right great toe plain films.  Imaging read by Dr. Short.    Right Toe Radiographs  Indication: right foot pain  Views: AP, lateral and oblique views of right great toe     Comparison: 8/31/2020     Findings:  Fracture at the distal end of the proximal phalanx of the great toe appears to be healing, with no unusual bony features.      Assessment:  1. Fracture follow-up    2. Closed displaced fracture of proximal phalanx of right great toe with routine healing, subsequent encounter        Plan:  1. Right great toe fracture remains stable with continued healing noted.  2. Patient may " continue advancing activities as tolerated.  3. Still recommend use of compression socks for swelling control.  4. For now he will follow-up as needed.  5. Questions and concerns answered.      Griselda Elam PA-C  11/05/20  14:36 MARIA T Rangel disclaimer:  Much of this encounter note is an electronic transcription/translation of spoken language to printed text. The electronic translation of spoken language may permit erroneous, or at times, nonsensical words or phrases to be inadvertently transcribed; Although I have reviewed the note for such errors, some may still exist.

## 2021-04-12 ENCOUNTER — DOCUMENTATION (OUTPATIENT)
Dept: INTERNAL MEDICINE | Facility: HOSPITAL | Age: 51
End: 2021-04-12

## 2021-04-12 RX ORDER — TELMISARTAN 40 MG/1
40 TABLET ORAL DAILY
Status: CANCELLED
Start: 2021-04-12

## 2021-05-12 ENCOUNTER — DOCUMENTATION (OUTPATIENT)
Dept: INTERNAL MEDICINE | Facility: HOSPITAL | Age: 51
End: 2021-05-12

## 2021-05-12 RX ORDER — TELMISARTAN 40 MG/1
40 TABLET ORAL DAILY
Qty: 90 TABLET | Refills: 3 | Status: SHIPPED | OUTPATIENT
Start: 2021-05-12 | End: 2022-05-19 | Stop reason: SDUPTHER

## 2022-01-02 PROCEDURE — U0004 COV-19 TEST NON-CDC HGH THRU: HCPCS | Performed by: FAMILY MEDICINE

## 2022-05-19 ENCOUNTER — APPOINTMENT (OUTPATIENT)
Dept: GENERAL RADIOLOGY | Facility: HOSPITAL | Age: 52
End: 2022-05-19

## 2022-05-19 ENCOUNTER — LAB (OUTPATIENT)
Dept: LAB | Facility: HOSPITAL | Age: 52
End: 2022-05-19

## 2022-05-19 ENCOUNTER — OFFICE VISIT (OUTPATIENT)
Dept: INTERNAL MEDICINE | Facility: CLINIC | Age: 52
End: 2022-05-19

## 2022-05-19 VITALS
TEMPERATURE: 97.3 F | WEIGHT: 242.8 LBS | SYSTOLIC BLOOD PRESSURE: 126 MMHG | HEIGHT: 74 IN | HEART RATE: 80 BPM | DIASTOLIC BLOOD PRESSURE: 78 MMHG | BODY MASS INDEX: 31.16 KG/M2 | RESPIRATION RATE: 12 BRPM

## 2022-05-19 DIAGNOSIS — Z12.5 PROSTATE CANCER SCREENING: ICD-10-CM

## 2022-05-19 DIAGNOSIS — Z13.220 SCREENING FOR LIPID DISORDERS: ICD-10-CM

## 2022-05-19 DIAGNOSIS — Z23 IMMUNIZATION DUE: ICD-10-CM

## 2022-05-19 DIAGNOSIS — G89.29 CHRONIC MIDLINE LOW BACK PAIN WITHOUT SCIATICA: ICD-10-CM

## 2022-05-19 DIAGNOSIS — K40.90 INGUINAL HERNIA OF RIGHT SIDE WITHOUT OBSTRUCTION OR GANGRENE: ICD-10-CM

## 2022-05-19 DIAGNOSIS — Z12.11 SCREENING FOR COLON CANCER: ICD-10-CM

## 2022-05-19 DIAGNOSIS — Z00.00 PHYSICAL EXAM: Primary | ICD-10-CM

## 2022-05-19 DIAGNOSIS — M54.50 CHRONIC MIDLINE LOW BACK PAIN WITHOUT SCIATICA: ICD-10-CM

## 2022-05-19 LAB
ALBUMIN SERPL-MCNC: 5 G/DL (ref 3.5–5.2)
ALBUMIN/GLOB SERPL: 2.5 G/DL
ALP SERPL-CCNC: 83 U/L (ref 39–117)
ALT SERPL W P-5'-P-CCNC: 24 U/L (ref 1–41)
ANION GAP SERPL CALCULATED.3IONS-SCNC: 10.6 MMOL/L (ref 5–15)
AST SERPL-CCNC: 24 U/L (ref 1–40)
BASOPHILS # BLD AUTO: 0.07 10*3/MM3 (ref 0–0.2)
BASOPHILS NFR BLD AUTO: 1.1 % (ref 0–1.5)
BILIRUB BLD-MCNC: NEGATIVE MG/DL
BILIRUB SERPL-MCNC: 0.5 MG/DL (ref 0–1.2)
BUN SERPL-MCNC: 26 MG/DL (ref 6–20)
BUN/CREAT SERPL: 29.5 (ref 7–25)
CALCIUM SPEC-SCNC: 9.4 MG/DL (ref 8.6–10.5)
CHLORIDE SERPL-SCNC: 103 MMOL/L (ref 98–107)
CHOLEST SERPL-MCNC: 191 MG/DL (ref 0–200)
CLARITY, POC: CLEAR
CO2 SERPL-SCNC: 25.4 MMOL/L (ref 22–29)
COLOR UR: YELLOW
CREAT SERPL-MCNC: 0.88 MG/DL (ref 0.76–1.27)
DEPRECATED RDW RBC AUTO: 38.7 FL (ref 37–54)
EGFRCR SERPLBLD CKD-EPI 2021: 104.8 ML/MIN/1.73
EOSINOPHIL # BLD AUTO: 0.26 10*3/MM3 (ref 0–0.4)
EOSINOPHIL NFR BLD AUTO: 4.2 % (ref 0.3–6.2)
ERYTHROCYTE [DISTWIDTH] IN BLOOD BY AUTOMATED COUNT: 12 % (ref 12.3–15.4)
EXPIRATION DATE: NORMAL
GLOBULIN UR ELPH-MCNC: 2 GM/DL
GLUCOSE SERPL-MCNC: 108 MG/DL (ref 65–99)
GLUCOSE UR STRIP-MCNC: NEGATIVE MG/DL
HBA1C MFR BLD: 5.7 % (ref 4.8–5.6)
HCT VFR BLD AUTO: 44.6 % (ref 37.5–51)
HCV AB SER DONR QL: NORMAL
HDLC SERPL-MCNC: 37 MG/DL (ref 40–60)
HGB BLD-MCNC: 15.5 G/DL (ref 13–17.7)
IMM GRANULOCYTES # BLD AUTO: 0.01 10*3/MM3 (ref 0–0.05)
IMM GRANULOCYTES NFR BLD AUTO: 0.2 % (ref 0–0.5)
KETONES UR QL: NEGATIVE
LDLC SERPL CALC-MCNC: 137 MG/DL (ref 0–100)
LDLC/HDLC SERPL: 3.66 {RATIO}
LEUKOCYTE EST, POC: NEGATIVE
LYMPHOCYTES # BLD AUTO: 1.82 10*3/MM3 (ref 0.7–3.1)
LYMPHOCYTES NFR BLD AUTO: 29.4 % (ref 19.6–45.3)
Lab: NORMAL
MCH RBC QN AUTO: 30.5 PG (ref 26.6–33)
MCHC RBC AUTO-ENTMCNC: 34.8 G/DL (ref 31.5–35.7)
MCV RBC AUTO: 87.8 FL (ref 79–97)
MONOCYTES # BLD AUTO: 0.56 10*3/MM3 (ref 0.1–0.9)
MONOCYTES NFR BLD AUTO: 9.1 % (ref 5–12)
NEUTROPHILS NFR BLD AUTO: 3.46 10*3/MM3 (ref 1.7–7)
NEUTROPHILS NFR BLD AUTO: 56 % (ref 42.7–76)
NITRITE UR-MCNC: NEGATIVE MG/ML
NRBC BLD AUTO-RTO: 0 /100 WBC (ref 0–0.2)
PH UR: 6 [PH] (ref 5–8)
PLATELET # BLD AUTO: 226 10*3/MM3 (ref 140–450)
PMV BLD AUTO: 9.9 FL (ref 6–12)
POTASSIUM SERPL-SCNC: 3.9 MMOL/L (ref 3.5–5.2)
PROT SERPL-MCNC: 7 G/DL (ref 6–8.5)
PROT UR STRIP-MCNC: NEGATIVE MG/DL
PSA SERPL-MCNC: 1.08 NG/ML (ref 0–4)
RBC # BLD AUTO: 5.08 10*6/MM3 (ref 4.14–5.8)
RBC # UR STRIP: NEGATIVE /UL
SODIUM SERPL-SCNC: 139 MMOL/L (ref 136–145)
SP GR UR: 1.02 (ref 1–1.03)
TRIGL SERPL-MCNC: 93 MG/DL (ref 0–150)
TSH SERPL DL<=0.05 MIU/L-ACNC: 2.1 UIU/ML (ref 0.27–4.2)
UROBILINOGEN UR QL: NORMAL
VLDLC SERPL-MCNC: 17 MG/DL (ref 5–40)
WBC NRBC COR # BLD: 6.18 10*3/MM3 (ref 3.4–10.8)

## 2022-05-19 PROCEDURE — 80053 COMPREHEN METABOLIC PANEL: CPT | Performed by: INTERNAL MEDICINE

## 2022-05-19 PROCEDURE — 85025 COMPLETE CBC W/AUTO DIFF WBC: CPT | Performed by: INTERNAL MEDICINE

## 2022-05-19 PROCEDURE — 86803 HEPATITIS C AB TEST: CPT | Performed by: INTERNAL MEDICINE

## 2022-05-19 PROCEDURE — G0103 PSA SCREENING: HCPCS | Performed by: INTERNAL MEDICINE

## 2022-05-19 PROCEDURE — 81003 URINALYSIS AUTO W/O SCOPE: CPT | Performed by: INTERNAL MEDICINE

## 2022-05-19 PROCEDURE — 99396 PREV VISIT EST AGE 40-64: CPT | Performed by: INTERNAL MEDICINE

## 2022-05-19 PROCEDURE — 83036 HEMOGLOBIN GLYCOSYLATED A1C: CPT | Performed by: INTERNAL MEDICINE

## 2022-05-19 PROCEDURE — 84443 ASSAY THYROID STIM HORMONE: CPT | Performed by: INTERNAL MEDICINE

## 2022-05-19 PROCEDURE — 91305 COVID-19 (PFIZER) 12+ YRS: CPT | Performed by: INTERNAL MEDICINE

## 2022-05-19 PROCEDURE — 0054A COVID-19 (PFIZER) 12+ YRS: CPT | Performed by: INTERNAL MEDICINE

## 2022-05-19 PROCEDURE — 80061 LIPID PANEL: CPT | Performed by: INTERNAL MEDICINE

## 2022-05-19 RX ORDER — LAMOTRIGINE 25 MG/1
2 TABLET ORAL NIGHTLY
COMMUNITY
Start: 2022-05-18

## 2022-05-19 RX ORDER — VENLAFAXINE HYDROCHLORIDE 150 MG/1
150 CAPSULE, EXTENDED RELEASE ORAL EVERY MORNING
COMMUNITY
Start: 2022-04-15 | End: 2022-10-03 | Stop reason: DRUGHIGH

## 2022-05-19 RX ORDER — TELMISARTAN 40 MG/1
40 TABLET ORAL DAILY
COMMUNITY

## 2022-05-19 RX ORDER — OXCARBAZEPINE 600 MG/1
600 TABLET, FILM COATED ORAL
COMMUNITY
Start: 2022-04-15

## 2022-05-19 NOTE — PROGRESS NOTES
Chief Complaint   Patient presents with   • Annual Exam       History of Present Illness    The patient presents for an established patient physical examination and health maintenance visit.    The patient presents for a follow-up related to low back pain which has been a chronic problem. He states that the pain was first noted upon awakening from sleep. The pain was present when the patient awakened from sleep. There was no trauma prior to the start of the pain. The pain is not associated with fever, chills, weight loss, rashes, dysuria, hematuria or a decreased urine stream. The patient denies a past history of malignancy. The pain does not radiate. There is no numbness. The patient denies loss of bladder control. The patient denies loss of bowel control. The patient reports no aggravating factors.    Review of Systems    CONSTITUTIONAL- Denies Sweats, Fatigue, Weakness or Malaise.    NECK- Denies Decreased Range of Motion, Stiffness, Thyroid Nodules, Enlarged Lymph Nodes or Goiter.    EYES- Denies Eye Pain, Eye Drainage, Eye Redness, Eye Discharge, Decreased Vision, Visual Disturbance, Diplopia, Visual Loss or Swollen Eyelid.    HENT- Denies Nasal Discharge, Sore Throat, Ear Pain, Ear Drainage, Headache, Sinus Pain, Nasal Congestion, Decreased Hearing or Tinnitus.    PULMONARY- Denies Wheezing, Dyspnea, Sputum Production, Cough, Hemoptysis or Pleuritic Chest Pain.    GASTROINTESTINAL- Denies Abdominal Pain, Nausea, Vomiting, Diarrhea, Blood per Rectum, Constipation or Heartburn.    GENITOURINARY- Denies Penile Discharge, Erectile Dysfunction, Testicular Mass, Testicular Pain, Hernia, Nocturia, Incomplete Voiding, Urinary Frequency or Urinary Urgency.    CARDIOVASCULAR- Denies Chest Pain, Claudication, Edema, Syncope, Palpitations or Irregular Heart Beat.    ENDOCRINE- Denies Cold Intolerance, Depression, Hair Loss, Heat Intolerance, Memory Loss, Polydypsia, Polyphagia, Polyuria, Sleep Disturbance or Weight  "Gain.    NEUROLOGIC- Denies Seizures, Visual Changes, Confusion or Excessive Daytime Sleepiness.    MUSCULOSKELETAL- Denies Joint Pain, Joint Stiffness, Decreased Range of Motion, Joint Swelling or Erythema of Joints.    INTEGUMENTARY- Denies Lumps, Sores, Itching, Dryness, Color Change, Changes in Hair, Brittle Nails, Discolored Nails, Thickened Nails, Growths or Alopecia.    Medications      Current Outpatient Medications:   •  lamoTRIgine (LaMICtal) 25 MG tablet, Take 2 tablets by mouth Every Night., Disp: , Rfl:   •  OXcarbazepine (TRILEPTAL) 600 MG tablet, Take 600 mg by mouth every night at bedtime., Disp: , Rfl:   •  telmisartan (MICARDIS) 40 MG tablet, Take 40 mg by mouth Daily., Disp: , Rfl:   •  venlafaxine XR (EFFEXOR-XR) 150 MG 24 hr capsule, Take 150 mg by mouth Every Morning., Disp: , Rfl:      Allergies    Allergies   Allergen Reactions   • No Known Drug Allergy        Problem List    There is no problem list on file for this patient.      Medications, Allergies, Problems List and Past History were reviewed and updated.    Physical Examination    /78 (BP Location: Left arm, Patient Position: Sitting, Cuff Size: Adult)   Pulse 80   Temp 97.3 °F (36.3 °C) (Infrared)   Resp 12   Ht 188 cm (74\")   Wt 110 kg (242 lb 12.8 oz)   BMI 31.17 kg/m²     HEENT: Head- Normocephalic Atraumatic. Facies- Within normal limits. Pinnas- Normal texture and shape bilaterally. Canals- Normal bilaterally. TMs- Normal bilaterally. Nares- Patent bilaterally. Nasal Septum- is normal. There is no tenderness to palpation over the frontal or maxillary sinuses. Lids- Normal bilaterally. Conjunctiva- Clear bilaterally. Sclera- Anicteric bilaterally. Oropharynx- Moist with no lesions. Tonsils- No enlargement, erythema or exudate.    Neck: Thyroid- non enlarged, symmetric and has no nodules. No bruits are detected. ROM- Normal Range of Motion with no rigidity.    Lungs: Auscultation- Clear to auscultation bilaterally. " There are no retractions, clubbing or cyanosis. The Expiratory to Inspiratory ratio is equal.    Lymph Nodes: Cervical- no enlarged lymph nodes noted. Clavicular- no enlarged supraclavicular lymph nodes noted. Axillary- no enlarged axillary lymph nodes noted. Inguinal- no enlarged inguinal lymph nodes noted.    Cardiovascular: There are no carotid bruits. Heart- Normal Rate with Regular rhythm and no murmurs. There are no gallops. There are no rubs. In the lower extremities there is no edema. The upper extremities do not have edema.    Abdomen: Noted to have a hernia but is not noted to have rigidity, a mass, an enlarged liver, an enlarged spleen, an enlarged right kidney, an enlarged left kidney, tenderness, pulsatile mass or scarring. The hernia is located in the R pelvic.    Male Genitourinary: The penis is circumcised with testicles found in the scrotum bilaterally. Testicular Size is normal bilaterally. The penis has no anatomic abnormalities.    Rectal: reveals normal external sphincter tone with no external lesions.    Prostate: The prostate gland is symmetric and smooth with no nodularity.    Back: The patient has a normal spinal curvature with no evidence of scoliosis. There are no skin lesions noted on the back. Palpation reveals no tenderness and normal muscle tone. The straight leg raising test is negative. The back has normal flexion, extension, rotation, and lateral bending.    Dermatologic: The patient has no worrisome or suspicious skin lesions noted.    Impression and Assessment    Normal Physical Examination.    Low Back Pain.    Inguinal Hernia.    Plan    Inguinal Hernia Plan: The patient will be monitored for any change in condition. He wants to wait until the fall to see surgery. He will contact me when he is ready. Discussed signs of worsening and incarceration.    Low Back Pain Plan: Further plans will be made after the test results are received and reviewed.    Counseling was provided  regarding: Adequate Aerobic Exercise, Dental Visits, Flossing Teeth and Heart Healthy Diet.    The following was ordered for screening and health maintenance: CBC w Automated Diff, CMP, Colonoscopy, Hepatitis C Antibody, Lipid Profile, TSH and U/A.    Counseled regarding immunizations and applicable VIS given.    Immunizations Ordered and Administered: Covid-19.    Diagnoses and all orders for this visit:    1. Physical exam (Primary)  -     CBC & Differential; Future  -     Comprehensive Metabolic Panel; Future  -     Hemoglobin A1c; Future  -     TSH; Future  -     POC Urinalysis Dipstick, Automated; Future  -     Hepatitis C Antibody; Future    2. Screening for colon cancer  -     Ambulatory Referral For Screening Colonoscopy    3. Prostate cancer screening  -     PSA Screen; Future    4. Immunization due  -     COVID-19 Vaccine (Pfizer) Gray Cap    5. Inguinal hernia of right side without obstruction or gangrene    6. Chronic midline low back pain without sciatica  -     XR Spine Lumbar Complete With Flex & Ext; Future    7. Screening for lipid disorders  -     Lipid Panel; Future        Return to Office    The patient was instructed to return for follow-up in 1 month. The patient was instructed to return sooner if the condition changes, worsens, or does not resolve.

## 2022-06-15 DIAGNOSIS — G89.29 CHRONIC MIDLINE LOW BACK PAIN WITHOUT SCIATICA: Primary | ICD-10-CM

## 2022-06-15 DIAGNOSIS — M54.50 CHRONIC MIDLINE LOW BACK PAIN WITHOUT SCIATICA: Primary | ICD-10-CM

## 2022-07-07 DIAGNOSIS — Z12.11 COLON CANCER SCREENING: Primary | ICD-10-CM

## 2022-07-20 ENCOUNTER — TELEPHONE (OUTPATIENT)
Dept: INTERNAL MEDICINE | Facility: CLINIC | Age: 52
End: 2022-07-20

## 2022-07-20 DIAGNOSIS — M54.50 CHRONIC MIDLINE LOW BACK PAIN WITHOUT SCIATICA: Primary | ICD-10-CM

## 2022-07-20 DIAGNOSIS — G89.29 CHRONIC MIDLINE LOW BACK PAIN WITHOUT SCIATICA: Primary | ICD-10-CM

## 2022-07-20 NOTE — TELEPHONE ENCOUNTER
Referral faxed and I have sent the patient a [employer] [facility] message informing  See referral for any further communications

## 2022-07-20 NOTE — TELEPHONE ENCOUNTER
----- Message from Madison Paige sent at 2022  8:16 AM EDT -----  Regarding: FW: Physical Therapy referral      ----- Message -----  From: Lloyd Yanez  Sent: 2022   9:46 AM EDT  To: Madison Paige  Subject: Physical Therapy referral                        Hello, I was not able to schedule my PT evaluation until the last few days.  Unfortunately, the PT office said that my order has  ([facility]).  Would you be able to send a new order?  Thank you,  Lloyd.

## 2022-07-26 ENCOUNTER — OUTSIDE FACILITY SERVICE (OUTPATIENT)
Dept: GASTROENTEROLOGY | Facility: CLINIC | Age: 52
End: 2022-07-26

## 2022-07-26 PROCEDURE — 88305 TISSUE EXAM BY PATHOLOGIST: CPT | Performed by: INTERNAL MEDICINE

## 2022-07-26 PROCEDURE — 45385 COLONOSCOPY W/LESION REMOVAL: CPT | Performed by: INTERNAL MEDICINE

## 2022-07-26 PROCEDURE — G0500 MOD SEDAT ENDO SERVICE >5YRS: HCPCS | Performed by: INTERNAL MEDICINE

## 2022-07-27 ENCOUNTER — LAB REQUISITION (OUTPATIENT)
Dept: LAB | Facility: HOSPITAL | Age: 52
End: 2022-07-27

## 2022-07-27 DIAGNOSIS — Z12.11 ENCOUNTER FOR SCREENING FOR MALIGNANT NEOPLASM OF COLON: ICD-10-CM

## 2022-07-28 LAB
CYTO UR: NORMAL
LAB AP CASE REPORT: NORMAL
LAB AP CLINICAL INFORMATION: NORMAL
PATH REPORT.FINAL DX SPEC: NORMAL
PATH REPORT.GROSS SPEC: NORMAL

## 2022-07-29 ENCOUNTER — TELEPHONE (OUTPATIENT)
Dept: GASTROENTEROLOGY | Facility: CLINIC | Age: 52
End: 2022-07-29

## 2022-07-29 NOTE — TELEPHONE ENCOUNTER
----- Message from Mayur Meza MD sent at 7/28/2022  2:42 PM EDT -----  Please let Milton know he had an adenoma.  Follow-up in 5 years time is recommended

## 2022-07-29 NOTE — TELEPHONE ENCOUNTER
I TRIED TO CALL [facility] TO GIVE PATHOLOGY RESULTS. NO ANSWER; LEFT VOICE MESSAGE. I WILL SEND RESULTS TO PATIENT'S MY CHART AND THE MAIL.

## 2022-09-07 ENCOUNTER — OFFICE VISIT (OUTPATIENT)
Dept: INTERNAL MEDICINE | Facility: CLINIC | Age: 52
End: 2022-09-07

## 2022-09-07 VITALS
DIASTOLIC BLOOD PRESSURE: 78 MMHG | OXYGEN SATURATION: 96 % | RESPIRATION RATE: 18 BRPM | SYSTOLIC BLOOD PRESSURE: 140 MMHG | BODY MASS INDEX: 31.82 KG/M2 | WEIGHT: 247.8 LBS | TEMPERATURE: 98.2 F | HEART RATE: 85 BPM

## 2022-09-07 DIAGNOSIS — R51.9 ACUTE INTRACTABLE HEADACHE, UNSPECIFIED HEADACHE TYPE: Primary | ICD-10-CM

## 2022-09-07 PROCEDURE — 99213 OFFICE O/P EST LOW 20 MIN: CPT | Performed by: STUDENT IN AN ORGANIZED HEALTH CARE EDUCATION/TRAINING PROGRAM

## 2022-09-07 RX ORDER — LORATADINE 10 MG/1
10 TABLET ORAL DAILY
Qty: 30 TABLET | Refills: 3 | Status: SHIPPED | OUTPATIENT
Start: 2022-09-07

## 2022-09-07 NOTE — ASSESSMENT & PLAN NOTE
-   Patient with symptoms several days prior to clinical exam on 9/7/2022  -   Red flag signs present with patient include: Diplopia with intractable headache that is new and more severe than any headaches experience previously, balance abnormalities, and headache waking him from sleep  -   Patient is also congested with previous history of congestion/allergic rhinitis related headaches  -   Patient without severe signs during neurologic exam on 9/7/2022  Plan:  -   CT head without contrast stat ordered on 9/7/2022 due to multiple red flag signs as noted above: Pending  -   Initiate 9/7/2022: Loratadine daily for congestion symptoms likely related to allergic rhinitis, patient did not previously experience significant symptom relief with intranasal steroid  -   Strict return precautions given to patient prior to discharge

## 2022-09-07 NOTE — PROGRESS NOTES
Follow Up Office Visit      Date: 2022   Patient Name: Lloyd Yanez  : 1970   MRN: [id number]     Chief Complaint:    Chief Complaint   Patient presents with   • Eye Pain     Severe headache for 8 days.        History of Present Illness: Lloyd Yanez is a 51 y.o. male who is here today for headache.    Intractable headache  - Symptoms began several days prior to clinical exam currently with worsening symptoms  - notes that he felt congestion, but no drainage  - states that pressure behind is more severe  - notes double vision within the last 24 hours  - notes more pressure in his frontal area  - denies any changes with ibuprofen  - pain level is worse at night: notes pain wakes him from sleep  - notes ophthomalogic changes previously about 6 months ago  - no history of migraines  - notes some nausea, but was following ibuprofen on empty stomach  - notes balance feels off  - denies any other neurologic changes        Subjective      Review of Systems:   Review of Systems   Constitutional: Negative for activity change, appetite change, fatigue and fever.   Eyes: Positive for double vision. Negative for blurred vision, photophobia and visual disturbance.   Respiratory: Negative for cough, chest tightness and shortness of breath.    Cardiovascular: Negative for chest pain and palpitations.   Gastrointestinal: Negative for abdominal distention, abdominal pain, blood in stool, constipation, diarrhea, nausea and vomiting.   Genitourinary: Negative for dysuria and hematuria.   Musculoskeletal: Negative for arthralgias, back pain and joint swelling.   Skin: Negative for rash and wound.   Neurological: Positive for dizziness and headache. Negative for weakness and confusion.   Psychiatric/Behavioral: Positive for sleep disturbance.       I have reviewed the patients family history, social history, past medical history, past surgical history and have updated it as appropriate.     Medications:     Current  Outpatient Medications:   •  lamoTRIgine (LaMICtal) 25 MG tablet, Take 2 tablets by mouth Every Night., Disp: , Rfl:   •  OXcarbazepine (TRILEPTAL) 600 MG tablet, Take 600 mg by mouth every night at bedtime., Disp: , Rfl:   •  telmisartan (MICARDIS) 40 MG tablet, Take 40 mg by mouth Daily., Disp: , Rfl:   •  venlafaxine XR (EFFEXOR-XR) 150 MG 24 hr capsule, Take 150 mg by mouth Every Morning., Disp: , Rfl:   •  loratadine (Claritin) 10 MG tablet, Take 1 tablet by mouth Daily., Disp: 30 tablet, Rfl: 3    Allergies:   Allergies   Allergen Reactions   • No Known Drug Allergy        Objective     Physical Exam: Please see above  Vital Signs:   Vitals:    09/07/22 1017   BP: 140/78   BP Location: Right arm   Patient Position: Sitting   Cuff Size: Adult   Pulse: 85   Resp: 18   Temp: 98.2 °F (36.8 °C)   TempSrc: Temporal   SpO2: 96%   Weight: 112 kg (247 lb 12.8 oz)   PainSc:   6     Body mass index is 31.82 kg/m².    Physical Exam  Vitals and nursing note reviewed.   Constitutional:       General: He is not in acute distress.     Appearance: Normal appearance. He is normal weight. He is not ill-appearing or toxic-appearing.   HENT:      Nose: No congestion or rhinorrhea.   Eyes:      General:         Right eye: No discharge.         Left eye: No discharge.      Conjunctiva/sclera: Conjunctivae normal.   Cardiovascular:      Rate and Rhythm: Normal rate and regular rhythm.      Heart sounds: Normal heart sounds. No murmur heard.    No friction rub.   Pulmonary:      Effort: Pulmonary effort is normal. No respiratory distress.      Breath sounds: Normal breath sounds. No wheezing or rhonchi.   Abdominal:      General: Abdomen is flat. Bowel sounds are normal. There is no distension.      Palpations: Abdomen is soft. There is no mass.      Tenderness: There is no abdominal tenderness. There is no guarding or rebound.   Musculoskeletal:      Cervical back: Normal range of motion.      Right lower leg: No edema.      Left  lower leg: No edema.   Skin:     Findings: No lesion or rash.   Neurological:      General: No focal deficit present.      Mental Status: He is alert and oriented to person, place, and time. Mental status is at baseline.      Cranial Nerves: Cranial nerves are intact. No cranial nerve deficit or facial asymmetry.      Sensory: No sensory deficit.      Motor: No weakness, tremor or abnormal muscle tone.      Coordination: Romberg sign negative. Coordination normal.      Gait: Gait normal.   Psychiatric:         Mood and Affect: Mood normal.         Behavior: Behavior normal.         Thought Content: Thought content normal.         Judgment: Judgment normal.         Procedures    Results:   Labs:   Hemoglobin A1C   Date Value Ref Range Status   05/19/2022 5.70 (H) 4.80 - 5.60 % Final     TSH   Date Value Ref Range Status   05/19/2022 2.100 0.270 - 4.200 uIU/mL Final        Imaging:   No valid procedures specified.     Assessment / Plan      Assessment/Plan:   Problem List Items Addressed This Visit        Neuro    Acute intractable headache - Primary    Current Assessment & Plan     -   Patient with symptoms several days prior to clinical exam on 9/7/2022  -   Red flag signs present with patient include: Diplopia with intractable headache that is new and more severe than any headaches experience previously, balance abnormalities, and headache waking him from sleep  -   Patient is also congested with previous history of congestion/allergic rhinitis related headaches  -   Patient without severe signs during neurologic exam on 9/7/2022  Plan:  -   CT head without contrast stat ordered on 9/7/2022 due to multiple red flag signs as noted above: Pending  -   Initiate 9/7/2022: Loratadine daily for congestion symptoms likely related to allergic rhinitis, patient did not previously experience significant symptom relief with intranasal steroid  -   Strict return precautions given to patient prior to discharge         Relevant  Medications    loratadine (Claritin) 10 MG tablet    Other Relevant Orders    CT Head Without Contrast            Follow Up:   Return in about 1 week (around 9/14/2022) for intractable headache.      Liban Bledsoe MD  [facility] [place] [facility] Jensen

## 2022-09-08 ENCOUNTER — TELEPHONE (OUTPATIENT)
Dept: INTERNAL MEDICINE | Facility: CLINIC | Age: 52
End: 2022-09-08

## 2022-09-08 NOTE — TELEPHONE ENCOUNTER
I have also not received the report.  I know this patient was working with Aurelia to determine the best place to receive the CT scan based on his insurance coverage and he may have gone to location that does not automatically send us the results.  Please let the patient know that we have not received the results and potentially coordination is needed with the location to perform the CT scan.  Please let me know if anything else!

## 2022-09-09 ENCOUNTER — DOCUMENTATION (OUTPATIENT)
Dept: INTERNAL MEDICINE | Facility: CLINIC | Age: 52
End: 2022-09-09

## 2022-09-09 ENCOUNTER — TELEPHONE (OUTPATIENT)
Dept: INTERNAL MEDICINE | Facility: CLINIC | Age: 52
End: 2022-09-09

## 2022-09-09 RX ORDER — AMOXICILLIN AND CLAVULANATE POTASSIUM 875; 125 MG/1; MG/1
1 TABLET, FILM COATED ORAL 2 TIMES DAILY
Qty: 20 TABLET | Refills: 0 | Status: SHIPPED | OUTPATIENT
Start: 2022-09-09 | End: 2022-09-19

## 2022-09-09 NOTE — TELEPHONE ENCOUNTER
Left detailed message advising Pt of clinical message from Dr. Bledsoe letting him know he has  not received the report. patient was working with Aurelia to determine the best place to receive the CT scan based on his insurance coverage and he may have gone to location that does not automatically send us the results.  We have not received the results and potentially coordination is needed with the location to perform the CT scan. Office number given if he has additional questions and if he can let us know where he did scan so results can be requested and reviewed.     Hub okay to relay message  I have also not received the report.  I know this patient was working with Aurelia to determine the best place to receive the CT scan based on his insurance coverage and he may have gone to location that does not automatically send us the results.  Please let the patient know that we have not received the results and potentially coordination is needed with the location to perform the CT scan.  Please let me know if anything else!

## 2022-09-09 NOTE — TELEPHONE ENCOUNTER
Caller: Lloyd Yanez    Relationship: Self    Best call back number: [phone]    What is the best time to reach you: PREFERABLY AFTER 4    Who are you requesting to speak with (clinical staff, provider,  specific staff member): CLINICAL    Do you know the name of the person who called: LLOYD    What was the call regarding: GETTING COORDINATION DONE TO GET THE CT RESULTS FROM [facility]    CT WAS DONE WED 7TH    Do you require a callback: YES

## 2022-09-09 NOTE — TELEPHONE ENCOUNTER
PATIENT CALLING TO GET RESULTS OF STAT CT Wednesday FROM [facility].  THEY STATE THEY HAVE FAXED THE RESULTS TWICE.  SENT TO WRONG FAX.  HE WILL CALL THEM WITH CORRECT FAX AND HAVE THEM RESEND.      PLEASE CALL [phone]

## 2022-09-11 NOTE — TELEPHONE ENCOUNTER
Results given.  They will continue current care and call me with an update in 72 hours.  If not better, will need MRI and MRA.

## 2022-09-15 NOTE — TELEPHONE ENCOUNTER
Spoke to patient he stated that the CT is in the chart now and he has spoken to Dr. Rangel already. Closing this message.

## 2022-10-03 ENCOUNTER — OFFICE VISIT (OUTPATIENT)
Dept: INTERNAL MEDICINE | Facility: CLINIC | Age: 52
End: 2022-10-03

## 2022-10-03 VITALS
HEART RATE: 87 BPM | RESPIRATION RATE: 18 BRPM | OXYGEN SATURATION: 97 % | TEMPERATURE: 98 F | WEIGHT: 249.6 LBS | BODY MASS INDEX: 32.05 KG/M2 | DIASTOLIC BLOOD PRESSURE: 80 MMHG | SYSTOLIC BLOOD PRESSURE: 142 MMHG

## 2022-10-03 DIAGNOSIS — J01.00 ACUTE NON-RECURRENT MAXILLARY SINUSITIS: Primary | ICD-10-CM

## 2022-10-03 PROCEDURE — 99213 OFFICE O/P EST LOW 20 MIN: CPT | Performed by: INTERNAL MEDICINE

## 2022-10-03 RX ORDER — VENLAFAXINE HYDROCHLORIDE 75 MG/1
75 CAPSULE, EXTENDED RELEASE ORAL EVERY MORNING
COMMUNITY
Start: 2022-09-13

## 2022-10-03 RX ORDER — CEFDINIR 300 MG/1
300 CAPSULE ORAL 2 TIMES DAILY
Qty: 20 CAPSULE | Refills: 0 | Status: SHIPPED | OUTPATIENT
Start: 2022-10-03 | End: 2022-10-13

## 2022-10-04 NOTE — PROGRESS NOTES
Chief Complaint   Patient presents with   • Headache     Follow up -- Acute intractable headache; MRI upcoming 10/19   • Nasal Congestion       History of Present Illness    The patient complains of facial pain and nasal congestion.    The patient reports a 3 week history of upper respiratory symptoms. The patient has symptoms of a headache but the patient does not have a dry cough, a wet cough, wheezing, fever, eye drainage, ear pain, ear drainage, nausea, vomiting, diarrhea, rash, decreased appetite, abdominal pain or sore throat. The patient has used antibiotics for treatment of this illness. The antibiotic used was Omnicef.  The patient is still taking the antibiotic. The symptoms have improved with the antibiotics.    Medications      Current Outpatient Medications:   •  lamoTRIgine (LaMICtal) 25 MG tablet, Take 2 tablets by mouth Every Night., Disp: , Rfl:   •  OXcarbazepine (TRILEPTAL) 600 MG tablet, Take 600 mg by mouth every night at bedtime., Disp: , Rfl:   •  telmisartan (MICARDIS) 40 MG tablet, Take 40 mg by mouth Daily., Disp: , Rfl:   •  venlafaxine XR (EFFEXOR-XR) 75 MG 24 hr capsule, Take 75 mg by mouth Every Morning., Disp: , Rfl:   •  cefdinir (OMNICEF) 300 MG capsule, Take 1 capsule by mouth 2 (Two) Times a Day for 10 days., Disp: 20 capsule, Rfl: 0  •  loratadine (Claritin) 10 MG tablet, Take 1 tablet by mouth Daily., Disp: 30 tablet, Rfl: 3     Allergies    Allergies   Allergen Reactions   • No Known Drug Allergy        Problem List    Patient Active Problem List   Diagnosis   • Acute intractable headache       Medications, Allergies, Problems List and Past History were reviewed and updated.    Physical Examination    /80   Pulse 87   Temp 98 °F (36.7 °C)   Resp 18   Wt 113 kg (249 lb 9.6 oz)   SpO2 97%   BMI 32.05 kg/m²     HEENT: Head- Normocephalic Atraumatic. Facies- Within normal limits. Pinnas- Normal texture and shape bilaterally. Canals- Normal bilaterally. TMs- Normal  bilaterally. Nares- Patent bilaterally. Nasal Septum- is normal. There is no tenderness to palpation over the frontal or maxillary sinuses. Lids- Normal bilaterally. Conjunctiva- Clear bilaterally. Sclera- Anicteric bilaterally. Oropharynx- Moist with no lesions. Tonsils- No enlargement, erythema or exudate.    Neck: Thyroid- non enlarged, symmetric and has no nodules. No bruits are detected. ROM- Normal Range of Motion with no rigidity.    Lungs: Auscultation- Clear to auscultation bilaterally. There are no retractions, clubbing or cyanosis. The Expiratory to Inspiratory ratio is equal.    Lymph Nodes: Cervical- no enlarged lymph nodes noted. Clavicular- Deferred. Axillary- Deferred. Inguinal- Deferred.    Cardiovascular: There are no carotid bruits. Heart- Normal Rate with Regular rhythm and no murmurs. There are no gallops. There are no rubs. In the lower extremities there is no edema. The upper extremities do not have edema.    Impression and Assessment    Acute Sinusitis.    Plan    Acute Sinusitis Plan: Use over the counter acetaminophen for fevers or comfort. A cool mist humidifier was encouraged. He was encouraged to drink plenty of fluids. Medication will be added as noted below.    Diagnoses and all orders for this visit:    1. Acute non-recurrent maxillary sinusitis (Primary)  -     cefdinir (OMNICEF) 300 MG capsule; Take 1 capsule by mouth 2 (Two) Times a Day for 10 days.  Dispense: 20 capsule; Refill: 0        Return to Office    The patient was instructed to return for follow-up as needed. The patient was instructed to return sooner if the condition changes, worsens, or does not resolve.

## 2022-10-24 ENCOUNTER — TELEPHONE (OUTPATIENT)
Dept: INTERNAL MEDICINE | Facility: CLINIC | Age: 52
End: 2022-10-24

## 2022-10-24 DIAGNOSIS — J34.1 CYST OF RIGHT SPHENOID SINUS: Primary | ICD-10-CM

## 2022-10-24 NOTE — TELEPHONE ENCOUNTER
Left voice mail message  For Pt to call back for message. Office number given.    Hub please relay message  ----- Message from Amari Vale MD sent at 10/23/2022 11:47 AM EDT -----  Please call the patient regarding his abnormal result. I have attempted to call him but got his answering service.  Please let him know that his MRI is essentially normal except for several mucous cysts in his sinuses.  If he is still having symptoms, I would recommend that he see ENT regarding this sinus cyst.  If he wants to move in this direction, please let me know and I'll put in the referral.      Pt resting comfortably.    Call light in reach.    No questions or concerns at this time.

## 2022-10-31 NOTE — TELEPHONE ENCOUNTER
Faxed and I have sent the patient a Sand Sign message informing  See referral for any further communications

## 2023-02-08 ENCOUNTER — TELEPHONE (OUTPATIENT)
Dept: INTERNAL MEDICINE | Facility: CLINIC | Age: 53
End: 2023-02-08
Payer: COMMERCIAL

## 2023-02-08 NOTE — TELEPHONE ENCOUNTER
Caller: Bettye Yanez    Relationship: Emergency Contact    Best call back number: [phone]    What is the best time to reach you: ANYTIME    Who are you requesting to speak with (clinical staff, provider,  specific staff member): CLINICAL STAFF    Do you know the name of the person who called: WIFE    What was the call regarding: PATIENTS WIFE WOULD LIKE A CALL TO SEE WHEN THE PATIENT RECEIVED HIS TETANUS SHOT DUE TO THE PATIENT CUTTING HIS SELF ON A BARN.    Do you require a callback: YES

## 2023-02-08 NOTE — TELEPHONE ENCOUNTER
Last Tdap - 11/2/2017    Spoke with patient wife, informed that last Tdap was 11/2/2017.  Verbalized understanding and appreciation.

## 2024-02-19 DIAGNOSIS — S69.92XA INJURY OF LEFT HAND, INITIAL ENCOUNTER: Primary | ICD-10-CM

## 2024-02-28 ENCOUNTER — TELEPHONE (OUTPATIENT)
Dept: INTERNAL MEDICINE | Facility: CLINIC | Age: 54
End: 2024-02-28
Payer: COMMERCIAL

## 2024-02-28 NOTE — TELEPHONE ENCOUNTER
Caller: Lloyd Yanez    Relationship to patient: Self    Best call back number: [phone] / CAN LEAVE VM    Chief complaint: THUMB ON LEFT HAND INJURY/ SWELLING AND PAINFUL    Type of visit: OFFICE    Requested date: [id number] IR [id number]     If rescheduling, when is the original appointment:      Additional notes:WILL SCHEDULE WITH ANY PROVIDER

## 2024-02-29 ENCOUNTER — OFFICE VISIT (OUTPATIENT)
Dept: INTERNAL MEDICINE | Facility: CLINIC | Age: 54
End: 2024-02-29
Payer: COMMERCIAL

## 2024-02-29 VITALS
HEART RATE: 76 BPM | BODY MASS INDEX: 32.02 KG/M2 | RESPIRATION RATE: 16 BRPM | DIASTOLIC BLOOD PRESSURE: 84 MMHG | SYSTOLIC BLOOD PRESSURE: 128 MMHG | TEMPERATURE: 98 F | WEIGHT: 249.38 LBS

## 2024-02-29 DIAGNOSIS — S69.92XA INJURY OF LEFT HAND, INITIAL ENCOUNTER: Primary | ICD-10-CM

## 2024-02-29 RX ORDER — EPLERENONE 50 MG/1
1 TABLET, FILM COATED ORAL DAILY
COMMUNITY
Start: 2024-02-04

## 2024-02-29 NOTE — PROGRESS NOTES
"Chief Complaint  Hand Pain (2 weeks ago injury)    Subjective        Lloyd Yanez presents to [facility]  Hand Pain       1. Left hand pain  He injured his left hand approximately 2 weeks ago. He weas carrying a bag of trash down some wooden steps and fell. He had x-rays completed and the results showed his thumb was normal; however, on the corner of this phalanx looks like an incomplete hairline fracture. He is inquiring if he has soft tissue damage. It is incredibly painful. He has been attempting not to do much with his left hand. He has been taking oral NSAIDs and using Voltaren gel on it.       Objective   Vital Signs:  /84 (BP Location: Right arm, Patient Position: Sitting, Cuff Size: Large Adult)   Pulse 76   Temp 98 °F (36.7 °C) (Temporal)   Resp 16   Wt 113 kg (249 lb 6 oz)   BMI 32.02 kg/m²   Estimated body mass index is 32.02 kg/m² as calculated from the following:    Height as of 5/19/22: 188 cm (74\").    Weight as of this encounter: 113 kg (249 lb 6 oz).       BMI is >= 30 and <35. (Class 1 Obesity). The following options were offered after discussion;: none (medical contraindication)      Physical Exam  Musculoskeletal:      Comments: Extremely focused physical on the left hand, specifically at digit # 1 around the lateral aspect of the phalanx here. Patient has just a small nodular soft tissue swelling here, most consistent with prior inflammatory response from a contusion or fall injury, but has good range of motion, good perfusion, and then no signs of subluxation or dislocation.   Neurological:      Mental Status: He is alert and oriented to person, place, and time.      Result Review :             Assessment and Plan     Diagnoses and all orders for this visit:    1. Injury of left hand, initial encounter (Primary)  -     Ambulatory Referral to Hand Surgery    1. Left hand injury.  - After reviewing history and physical here today, I did " recommend continuing with the anti-inflammatory.   - With the caveat, I think we will go ahead and use a compound cream of Neuroderm, which has 3 to 4 different additive muscle relaxer, neuropathic pain control, as well as anti-inflammatory in combination, which does very well with injury such as this, and I think it would be beneficial for him in his treatment.   - Regarding further assessment, I am going to go ahead and put in a hand surgery group referral here through [facility].  - That way if symptoms do not improve here, patient can go ahead and follow up with the hand surgeons here and then have them look and assess the injury.  - I did also  anticipatory guidance of over usage of the hand, so he does play guitar, so I did recommend refraining from that activity as of right now until he gets better range of motion, decrease pain, decrease swelling, so that overusing does not compound the recovery.   - Patient agreed with that.   - If there are any other issues or worsening symptoms, patient should let us know for reevaluation.  - Otherwise, patient will follow up with Dr. Pryor at scheduled follow-up.         Follow Up     No follow-ups on file.  Patient was given instructions and counseling regarding his condition or for health maintenance advice. Please see specific information pulled into the AVS if appropriate.       Transcribed from ambient dictation for Armen Lao MD by Keisha Amador.  02/29/24   15:19 EST    Patient or patient representative verbalized consent to the visit recording.  I have personally performed the services described in this document as transcribed by the above individual, and it is both accurate and complete.

## 2024-03-14 ENCOUNTER — OFFICE VISIT (OUTPATIENT)
Age: 54
End: 2024-03-14
Payer: COMMERCIAL

## 2024-03-14 VITALS
SYSTOLIC BLOOD PRESSURE: 120 MMHG | DIASTOLIC BLOOD PRESSURE: 70 MMHG | HEIGHT: 73 IN | BODY MASS INDEX: 32.87 KG/M2 | WEIGHT: 248 LBS

## 2024-03-14 DIAGNOSIS — S62.525A CLOSED NONDISPLACED FRACTURE OF DISTAL PHALANX OF LEFT THUMB, INITIAL ENCOUNTER: Primary | ICD-10-CM

## 2024-03-14 RX ORDER — OXCARBAZEPINE 60 MG/ML
SUSPENSION ORAL
COMMUNITY
Start: 2015-09-07

## 2024-03-14 RX ORDER — TELMISARTAN 80 MG/1
TABLET ORAL
COMMUNITY
Start: 2019-03-07

## 2024-03-14 NOTE — PROGRESS NOTES
[facility]     Office Visit       Date: 03/14/2024   Patient Name: Lloyd Yanez  MRN: [id number]  YOB: 1970    Referring Physician: No ref. provider found     Chief Complaint:   Chief Complaint   Patient presents with    Left Hand - Pain       History of Present Illness:   Lloyd Yanez is a 53 y.o. male right-hand-dominant presents with left thumb pain of 4 weeks duration.  Patient reports he had a mechanical fall from standing 4 weeks ago landed on his left thumb.  He reports that he had pain and swelling of the left thumb DIP joint that has been persistent over the last 4 weeks.  He sought care 2 weeks ago and had an x-ray and was referred to me.  He reports that he continues to have pain at the left thumb DIP particularly with gripping and pressure on the thumb.  Please guitar and says it has been difficult due to his injury.  He denies pain at the MCPJ or pain proximal to the metacarpal.  He works as a counselor.  He is otherwise healthy.  He denies smoking.      Subjective   Review of Systems:   Review of Systems   Constitutional:  Negative for chills, fever, unexpected weight gain and unexpected weight loss.   HENT:  Negative for congestion, postnasal drip and rhinorrhea.    Eyes:  Negative for blurred vision.   Respiratory:  Negative for shortness of breath.    Cardiovascular:  Negative for leg swelling.   Gastrointestinal:  Negative for abdominal pain, nausea and vomiting.   Genitourinary:  Negative for difficulty urinating.   Musculoskeletal:  Positive for arthralgias. Negative for gait problem, joint swelling and myalgias.   Skin:  Negative for skin lesions and wound.   Neurological:  Negative for dizziness, weakness, light-headedness and numbness.   Hematological:  Does not bruise/bleed easily.   Psychiatric/Behavioral:  Negative for depressed mood.         Pertinent review of systems per HPI.     I reviewed the  "patient's chief complaint, history of present illness, review of systems, past medical history, surgical history, family history, social history, medications and allergy list in the EMR on 03/14/2024 and agree with the findings above.    Objective    Vital Signs:   Vitals:    03/14/24 1117   BP: 120/70   Weight: 112 kg (248 lb)   Height: 185.4 cm (73\")     BMI: BMI is >= 30 and <35. (Class 1 Obesity). The following options were offered after discussion;: weight loss educational material (shared in after visit summary)       General Appearance: No acute distress. Alert and oriented.     Chest:  Non-labored breathing on room air. Regular rate and rhythm.    Left Upper Extremity Exam:    No palpable masses or visible lesions  Fingers are warm, well-perfused with appropriate capillary refill.  Palpable radial pulse.    Sensation intact to light touch in median, radial and ulnar nerve distributions.    Motor- Fires FPL, ulnar intrinsics, EPL/EDC w/ full active and passive range of motion. Strength intact.    Non-tender except for in the areas highlighted below    Mildly tender to palpation over the left thumb DIP particularly over the radial aspect.  There is a small area of focal swelling over the radial aspect.  Stable to varus and valgus stress.  Patient does have active flexion extension without crepitus however flexion is slightly limited secondary to pain    Nontender over to palpation of the radial and ulnar MCPJ.  Stable to varus and valgus stress.  Nontender over the thumb CMC.    Imaging/Studies:   Imaging Results (Last 24 Hours)       ** No results found for the last 24 hours. **            Outside x-ray of the left thumb was independently reviewed and interpreted by myself and does demonstrate a small dorsal radial avulsion fracture of the left thumb IP joint that is minimally displaced.  No other bony abnormality.  The IP joint appears congruent.    Procedures:  Procedures    Quality Measures:   ACP:   ACP " discussion was declined by the patient, Patient does not have an advance directive, declines further assistance.    Tobacco:   Lloyd Yanez  reports that he has never smoked. He has never used smokeless tobacco.      Assessment / Plan    Assessment/Plan:     There are no diagnoses linked to this encounter.     Lloyd Yanezis a 53 y.o. male who presents with:      ICD-10-CM ICD-9-CM   1. Closed nondisplaced fracture of distal phalanx of left thumb, initial encounter  S62.525A 816.02         Patient presents with a left thumb injury after mechanical fall from standing 4 weeks ago.  He has significant sprain of his left thumb IP joint and a small avulsion fracture however the joint remains congruent and the fracture is minimally displaced.  Recommend referral to physical therapy for gentle increase in range of motion of the left thumb.  Recommend follow-up in 6 weeks for repeat exam.  Follow Up:   Return in about 6 weeks (around 4/25/2024).        Jann Chinchilla MD  [facility]

## 2024-04-08 ENCOUNTER — TREATMENT (OUTPATIENT)
Dept: PHYSICAL THERAPY | Facility: CLINIC | Age: 54
End: 2024-04-08
Payer: COMMERCIAL

## 2024-04-08 DIAGNOSIS — M79.645 PAIN OF LEFT THUMB: ICD-10-CM

## 2024-04-08 DIAGNOSIS — M25.60 RANGE OF MOTION DEFICIT: Primary | ICD-10-CM

## 2024-04-08 NOTE — PROGRESS NOTES
Physical Therapy Initial Evaluation and Plan of Care  [facility] [place]  [address] [facility], [address] [address]    Patient: Lloyd Yanez   : 1970  Diagnosis/ICD-10 Code:  Range of motion deficit [M25.60]  Referring practitioner: Jann Chinchilla MD  Date of Initial Visit: 2024  Today's Date: 2024  Patient seen for 1 session         Visit Diagnoses:    ICD-10-CM ICD-9-CM   1. Range of motion deficit  M25.60 719.50   2. Pain of left thumb  M79.645 729.5         Subjective Questionnaire: QuickDASH: 23      Subjective Evaluation    History of Present Illness  Mechanism of injury: The pt reported that he fell down the stairs 7 weeks ago and sustained a distal radial sided avulsion fracture and sprain to the L thumb DIP. He was prescribed a medical cocktail of topical cream.     He gets pain with pressure, repeated use, and DIP flexion. He reported that stiffness is his primary concern. He has been trying to stretch independently. He also reported that he is getting numbness and tingling over the thumb, though this is improving.    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 He also plays guitar, bass, and drums and is having pain with gripping the fretboard. He plays shows infrequently.     Pain  Current pain ratin  At best pain ratin  At worst pain ratin  Location: L thumb  Quality: sharp, tight and throbbing  Relieving factors: rest and medications  Aggravating factors: lifting and repetitive movement  Progression: improved    Social Support  Lives in: multiple-level home  Lives with: spouse and young children    Hand dominance: right    Diagnostic Tests  X-ray: abnormal    Treatments  Current treatment: medication  Patient Goals  Patient goals for therapy: decreased pain, increased motion, increased strength, independence with ADLs/IADLs and  return to sport/leisure activities             Objective          Observations     Additional Wrist/Hand Observation Details  Mild swelling in the L thumb DIP    Palpation   Left   No palpable tenderness to the extensor pollicis longus, flexor carpi radialis and flexor pollicis longus.     Tenderness     Additional Tenderness Details  TTP in the radial collateral ligament at L MP and the lateral DIP    Neurological Testing     Sensation     Wrist/Hand   Left   Intact: light touch    Right   Intact: light touch    Active Range of Motion     Left Thumb   Flexion     MP: 60 degrees    DIP: 60 degrees  Extension     DIP: 50 degrees  Opposition: 5 deg to neutral L MP    Right Thumb   Flexion     MP: 70 degrees    DIP: 65 degrees  Extension     MP: 5 degrees    DIP: 60 degrees    Strength/Myotome Testing     Left Wrist/Hand      (2nd hand position)     Trial 1: 110 lbs    Trial 2: 115 lbs    Trial 3: 112 lbs    Average: 112.33 lbs    Thumb Strength  Key/Lateral Pinch     Trial 1: 16 lbs    Trial 2: 18 lbs    Trial 3: 17 lbs    Average: 17 lbs  Tip/Two-Point Pinch     Trial 1: 13 lbs    Trial 2: 15 lbs    Trial 3: 17 lbs    Average: 15 lbs    Right Wrist/Hand      (2nd hand position)     Trial 1: 115 lbs    Trial 2: 115 lbs    Trial 3: 115 lbs    Average: 115 lbs    Thumb Strength   Key/Lateral Pinch     Trial 1: 20 lbs    Trial 2: 20 lbs    Trial 3: 20 lbs    Average: 20 lbs  Tip/Two-Point Pinch     Trial 1: 25 lbs    Trial 2: 24 lbs    Trial 3: 22 lbs    Average: 23.67 lbs    Tests     Left Wrist/Hand   Positive RCL varus stress.   Negative UCL valgus stress.     Additional Tests Details  Positive MP grind test on L          Assessment & Plan       Assessment  Impairments: abnormal muscle firing, abnormal or restricted ROM, impaired physical strength, lacks appropriate home exercise program and pain with function   Functional limitations: carrying objects, lifting, pulling, uncomfortable because of pain and  unable to perform repetitive tasks   Assessment details: The patient is a 52 yo male who presented to PT with evolving characteristics of acute L thumb pain and stiffness with low complexity. Signs and symptoms are consistent with an avulsion fracture to the dorsal radial distal phalanx and concurrent ligament spraining on the radial side of the DIP and MP joint. AROM is fair but he reports stiffness and pain at end range flexion and extension, particularly at the MP. He has decent  strength and key  strength but tip-to-tip  is limited and painful. He will benefit from continued use of a thumb spica at night and when working on the farm to prevent excessive loading. I expect the patient to make a timely recovery with skilled PT intervention.     Prognosis: good    Goals  Plan Goals: Short Term Goals (4 weeks):     1. The patient will be independent and compliant with initial HEP.     2. The patient will report pain at rest 0/10 or less and worst pain 3/10 or less.    3. The patient will display decreased TTP in the L radial collateral ligament at the MP and dec mm tension in the surrounding musculature.    4. L DIP flex will improve to 65 deg, L MP flex to 70 deg.    5. The patient will demonstrate inc tip to tip strength 5/5.    6. Quick DASH will improve by 11 points or more.         Long Term Goals (8 weeks):     1. The patient will be appropriate for independent management and compliant with progressed HEP.     2. The patient will report pain at rest 0/10 or less and worst pain 2/10 or less.    3. The patient will return to work duties and/or ADLs with no limitations due to thumb pain or dysfunction.    4. The patient will return to guitar playing with no limitations due to thumb pain or dysfunction.      Plan  Therapy options: will be seen for skilled therapy services  Planned modality interventions: cryotherapy, iontophoresis, TENS, electrical stimulation/Russian stimulation and thermotherapy  (hydrocollator packs)  Planned therapy interventions: ADL retraining, body mechanics training, flexibility, functional ROM exercises, home exercise program, joint mobilization, manual therapy, neuromuscular re-education, postural training, soft tissue mobilization, strengthening, therapeutic activities and stretching  Frequency: 1x week  Duration in visits: 8  Duration in weeks: 8  Treatment plan discussed with: patient  Plan details: The patient will likely benefit from TE/TA/NMED to improve strength, UE proprioception, and scapular mobility. MT will be utilized in addition to stretching for improved elbow jt mobility and AROM. Modalities will be used as needed for pain modulation and reduction of swelling.           History # of Personal Factors and/or Comorbidities: LOW (0)  Examination of Body System(s): # of elements: LOW (1-2)  Clinical Presentation: EVOLVING  Clinical Decision Making: LOW       Timed:         Manual Therapy:    0     mins  03351;     Therapeutic Exercise:    10     mins  14427;     Neuromuscular Hayes:    0    mins  42930;    Therapeutic Activity:     0     mins  22609;     Gait Trainin     mins  62625;     Ultrasound:     0     mins  68787;    Ionto                               0    mins   46487  Self Care                       0     mins   40071  Canalith Repos    0     mins 39698      Un-Timed:  Electrical Stimulation:    0     mins  46293 (MC );  Dry Needling     0     mins self-pay  Traction     0     mins 95294  Low Eval     40     Mins  79510  Mod Eval     0     Mins  54466  High Eval                       0     Mins  79226        Timed Treatment:   10   mins   Total Treatment:     50   mins          PT: Emile Esteban PT     License Number: [id number]  Electronically signed by Emile Esteban PT, 24, 8:48 AM EDT    Certification Period: 2024 thru 2024  I certify that the therapy services are furnished while this patient is under my care.  The services outlined above  are required by this patient, and will be reviewed every 90 days.         Physician Signature:__________________________________________________    PHYSICIAN: Jann Chinchilla MD  NPI: [id number]                                      DATE:      Please sign and return via fax to [email or web address] . Thank you, [facility].

## 2024-04-22 ENCOUNTER — TREATMENT (OUTPATIENT)
Dept: PHYSICAL THERAPY | Facility: CLINIC | Age: 54
End: 2024-04-22
Payer: COMMERCIAL

## 2024-04-22 DIAGNOSIS — M25.60 RANGE OF MOTION DEFICIT: Primary | ICD-10-CM

## 2024-04-22 DIAGNOSIS — M79.645 PAIN OF LEFT THUMB: ICD-10-CM

## 2024-04-22 PROCEDURE — 97110 THERAPEUTIC EXERCISES: CPT | Performed by: PHYSICAL THERAPIST

## 2024-04-22 NOTE — PROGRESS NOTES
Physical Therapy Daily Treatment Note  [facility] [place]  [address] [facility] [address]  [place] [address]      Patient: Lloyd Yanez   : 1970  Referring practitioner: Jann Chinchilla MD  Date of Initial Visit: Type: THERAPY  Noted: 2024  Today's Date: 2024  Patient seen for 2 sessions       Visit Diagnoses:    ICD-10-CM ICD-9-CM   1. Range of motion deficit  M25.60 719.50   2. Pain of left thumb  M79.645 729.5       Subjective Evaluation    History of Present Illness  Mechanism of injury: The patient reported that his DIP pain has significantly improved and was negligible today.  He continues to have pain at the MP joint, which is localized to the radial aspect.  He has been consistent with his home exercise program and reported that it is getting easier.  He is no longer using his splint at night because pain has decreased.    Pain  Current pain ratin  Location: L thumb           Objective   See Exercise, Manual, and Modality Logs for complete treatment.       Assessment & Plan       Assessment  Assessment details: DIP pain has largely resolved and most of his pain is now on the radial aspect of the MP. This is likely due to a radial collateral ligament sprain, as the ligament appeared in tact with varus testing. He will benefit from return to bracing at night for 4 more weeks. At that point he will be appropriate for a strength and conditioning program to restore function.     Plan  Plan details: Pt to return in 4 weeks for strength and conditioning program.           Timed:         Manual Therapy:    0     mins  92425;     Therapeutic Exercise:    45     mins  78174;     Neuromuscular Hayes:    0    mins  23008;    Therapeutic Activity:     0     mins  61227;     Gait Trainin     mins  76963;     Ultrasound:     0     mins  95655;    Ionto                               0    mins   93789  Self Care                       0     mins   87618  [facility]    0     mins  48443      Un-Timed:  Electrical Stimulation:    0     mins  40459 ( );  Dry Needling     0     mins self-pay  Traction     0     mins 15527      Timed Treatment:   45   mins   Total Treatment:     45   mins    Emile Esteban, PT  [place] License: [id number]

## 2024-08-07 DIAGNOSIS — R51.9 ACUTE INTRACTABLE HEADACHE, UNSPECIFIED HEADACHE TYPE: Primary | ICD-10-CM

## 2024-08-26 ENCOUNTER — OFFICE VISIT (OUTPATIENT)
Dept: NEUROLOGY | Facility: CLINIC | Age: 54
End: 2024-08-26
Payer: COMMERCIAL

## 2024-08-26 VITALS
SYSTOLIC BLOOD PRESSURE: 130 MMHG | HEART RATE: 77 BPM | OXYGEN SATURATION: 97 % | BODY MASS INDEX: 32.6 KG/M2 | HEIGHT: 73 IN | WEIGHT: 246 LBS | DIASTOLIC BLOOD PRESSURE: 82 MMHG | TEMPERATURE: 98.2 F

## 2024-08-26 DIAGNOSIS — G44.001 INTRACTABLE CLUSTER HEADACHE SYNDROME, UNSPECIFIED CHRONICITY PATTERN: Primary | ICD-10-CM

## 2024-08-26 RX ORDER — GALCANEZUMAB 100 MG/ML
300 INJECTION, SOLUTION SUBCUTANEOUS
Qty: 3 ML | Refills: 5 | Status: SHIPPED | OUTPATIENT
Start: 2024-08-26

## 2024-08-26 NOTE — PROGRESS NOTES
New Patient Office Visit      Patient Name: Lloyd Yanez  : 1970   MRN: [id number]     Referring Physician: Amari Vale MD    Chief Complaint:    Chief Complaint   Patient presents with    Headache     X 3 years; Patient reports 10- headache days; last eye exam ; headaches start on right side of head       History of Present Illness: Lloyd Yanez is a 53 y.o. male who is here today to establish care with Neurology.  He reports having intense headaches in the right eye and right frontotemporal area for 2 or 3 months for the past 3 years.  They will start in July or August and then cease by September.  He says they come on quick and are very intense.  He rates them a 7+ on the pain scale.  He says he feels like his right eye is going to blow out and he holds his head and rocks back-and-forth.  These last for 1 to 2 hours.  He gets a lot of nasal/sinus congestion during these episodes.  He has seen ENT who also feel that he is having cluster headaches.  He denies any swelling of his eyelid, no drooping or tearing.  These generally occur in the night and will cease but he they will start up during the day as well.  He notes that he drank some alcohol one evening recently and it really seemed to worsen these.    He notes that he has had double vision in the past and is under the care of an ophthalmologist because he has retinal pigment epithelial detachment.  Because of this he has to be stimulant free and is unable to have steroids.  He has a prism made in the right lens of his glasses for this.    -MRI of the brain with and without contrast on 10/19/2022:  Impression:  1.  There is no evidence of acute ischemia or intracranial hemorrhage.  There are no abnormal enhancing mass lesions.  2.  There is mild paranasal sinus disease with several mucous retention cysts.  There is one in the right sphenoid sinus that may be contributing to the patient's symptoms.    Pertinent Medical History:  Retinal pigment epithelial detachment, hypertension, RAMONITA-had surgery for this in 2008, fusion of T3-L1    Subjective      Review of Systems:   Review of Systems   Neurological:  Positive for headache.       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     Sleep apnea     Surgery for RAMONITA 2008       Past Surgical History:   Past Surgical History:   Procedure Laterality Date    BACK SURGERY      COLONOSCOPY      SEPTOPLASTY      SINUS SURGERY  12/2008    Aamdo-modified UPPP, genioglossus advancement, FR turbinate reduction    SPINE SURGERY  8/12/1989    Fusion T3-L1 with Cotrel Dubousset rods    TONSILLECTOMY      UVULOPALATOPHARYNGOPLASTY      VERTEBROPLASTY  1989    Scoliosis correction - T-spine fusion w/ Cotrel Dubosett rods T3-L1; original c-curve 62 degrees corrected to 18 degrees       Family History:   Family History   Problem Relation Age of Onset    Depression Father     Hypertension Father     Diabetes Father     Neuropathy Father         Peripheral neuropathy secondary to IDDM    Heart disease Paternal Grandmother         CHF    Cancer Paternal Grandfather     Parkinsonism Mother         Diagnosed after fall in 2022; moderately advanced with gait/equilibrium disturbance, still currently ambulatory    Dementia Maternal Grandmother        Social History:   Social History     Socioeconomic History    Marital status:    Tobacco Use    Smoking status: Never    Smokeless tobacco: Never   Vaping Use    Vaping status: Never Used   Substance and Sexual Activity    Alcohol use: Yes     Alcohol/week: 2.0 standard drinks of alcohol     Types: 2 Cans of beer per week     Comment: ETOH stopped x 1 week after suspected trigger of H/A    Drug use: No    Sexual activity: Yes     Partners: Female      "Birth control/protection: Tubal ligation       Medications:     Current Outpatient Medications:     lamoTRIgine (LaMICtal) 25 MG tablet, Take 2 tablets by mouth Every Night., Disp: , Rfl:     OXcarbazepine (TRILEPTAL) 300 MG/5ML suspension, , Disp: , Rfl:     telmisartan (MICARDIS) 80 MG tablet, , Disp: , Rfl:     venlafaxine XR (EFFEXOR-XR) 75 MG 24 hr capsule, Take 1 capsule by mouth Every Morning., Disp: , Rfl:     Galcanezumab-gnlm (Emgality, 300 MG Dose,) 100 MG/ML solution prefilled syringe, Inject 300 mg under the skin into the appropriate area as directed Every 30 (Thirty) Days., Disp: 3 mL, Rfl: 5    Allergies:   Allergies   Allergen Reactions    No Known Drug Allergy        Objective     Physical Exam:  Vital Signs:   Vitals:    08/26/24 1323   BP: 130/82   Pulse: 77   Temp: 98.2 °F (36.8 °C)   SpO2: 97%   Weight: 112 kg (246 lb)   Height: 185.4 cm (73\")   PainSc: 0-No pain     Body mass index is 32.46 kg/m².     Physical Exam  Constitutional:       Appearance: Normal appearance.   HENT:      Head: Normocephalic.   Eyes:      Extraocular Movements: EOM normal.      Conjunctiva/sclera: Conjunctivae normal.      Pupils: Pupils are equal, round, and reactive to light.   Pulmonary:      Effort: Pulmonary effort is normal.   Musculoskeletal:         General: Normal range of motion.   Skin:     General: Skin is warm and dry.   Neurological:      General: No focal deficit present.      Mental Status: He is alert and oriented to person, place, and time.      Cranial Nerves: Cranial nerves 2-12 are intact. No dysarthria.      Motor: Motor function is intact. No tremor or pronator drift.      Coordination: Coordination is intact. Romberg sign negative. Finger-Nose-Finger Test normal.      Gait: Gait is intact.      Deep Tendon Reflexes:      Reflex Scores:       Bicep reflexes are 2+ on the right side and 2+ on the left side.       Brachioradialis reflexes are 2+ on the right side and 2+ on the left side.       " Patellar reflexes are 1+ on the right side and 1+ on the left side.  Psychiatric:         Attention and Perception: Attention normal.         Mood and Affect: Mood and affect normal.         Speech: Speech normal.         Behavior: Behavior normal. Behavior is cooperative.         Thought Content: Thought content normal.         Cognition and Memory: Cognition normal.         Judgment: Judgment normal.         Neurologic Exam     Mental Status   Oriented to person, place, and time.   Speech: speech is normal   Level of consciousness: alert  Knowledge: good.     Cranial Nerves   Cranial nerves II through XII intact.     CN III, IV, VI   Pupils are equal, round, and reactive to light.  Extraocular motions are normal.   Right pupil: Size: 4 mm. Shape: regular. Reactivity: brisk.   Left pupil: Size: 4 mm. Shape: regular. Reactivity: brisk.   Nystagmus: none     CN VII   Facial expression full, symmetric.     CN XII   Tongue: not atrophic  Fasciculations: absent  Tongue deviation: none    Motor Exam   Muscle bulk: normal  Overall muscle tone: normal  Right arm tone: normal  Left arm tone: normal  Right arm pronator drift: absent  Left arm pronator drift: absent  Right leg tone: normal  Left leg tone: normal    Strength   Right deltoid: 5/5  Left deltoid: 5/5  Right biceps: 5/5  Left biceps: 5/5  Right triceps: 5/5  Left triceps: 5/5  Right quadriceps: 5/5  Left quadriceps: 5/5  Right hamstrin/5  Left hamstrin/5  Right glutei: 5/5  Left glutei: 5/5  Right anterior tibial: 5/5  Left anterior tibial: 5/5  Right posterior tibial: 5/5  Left posterior tibial: 5/5  Right peroneal: 5/5  Left peroneal: 5/5  Right gastroc: 5/5  Left gastroc: 5/5Able to walk on his toes and heels easily     Gait, Coordination, and Reflexes     Gait  Gait: normal    Coordination   Finger to nose coordination: normal    Tremor   Resting tremor: absent  Intention tremor: absent  Action tremor: absent    Reflexes   Right brachioradialis:  2+  Left brachioradialis: 2+  Right biceps: 2+  Left biceps: 2+  Right patellar: 1+  Left patellar: 1+      Assessment / Plan      Assessment/Plan:   Diagnoses and all orders for this visit:    1. Intractable cluster headache syndrome, unspecified chronicity pattern (Primary)  -     Galcanezumab-gnlm (Emgality, 300 MG Dose,) 100 MG/ML solution prefilled syringe; Inject 300 mg under the skin into the appropriate area as directed Every 30 (Thirty) Days.  Dispense: 3 mL; Refill: 5       -This is a pleasant 53-year-old male patient here to establish care for cluster headaches.  He is currently on Lamictal, oxcarbazepine, telmisartan and venlafaxine.  He is not a candidate for triptans due to his hypertension and retinal pigment epithelial detachment of the right eye.  Will prescribe Emgality 300 mg monthly.  Discussed with patient he likely will need this x 4 months every year.  He could start taking in June, July, August and September.  To cover his cluster headaches.  Indications and side effects discussed with patient he verbalizes understanding.  MRI reviewed.  Patient will call in the interim if he has any questions or concerns or changes.    Follow Up:   Return in about 3 months (around 11/26/2024).    CHRISTINA Lester, FNP-BC  [facility]

## 2024-08-27 ENCOUNTER — TELEPHONE (OUTPATIENT)
Dept: NEUROLOGY | Facility: CLINIC | Age: 54
End: 2024-08-27
Payer: COMMERCIAL

## 2024-08-27 NOTE — TELEPHONE ENCOUNTER
Emgality was denied by insurance because r there is a medical reason the member is unable to take the medication verapamil (360mg to 960mg per day dose) AND ONE MORE of the following medications for at least 2 weeks per trial: a) Lithium, b) Topiramate, or c) Melatonin. Documentation showing the start and end dates of trial medications must be submitted.

## 2024-08-29 DIAGNOSIS — G44.001 INTRACTABLE CLUSTER HEADACHE SYNDROME, UNSPECIFIED CHRONICITY PATTERN: Primary | ICD-10-CM

## 2024-08-29 RX ORDER — VERAPAMIL HYDROCHLORIDE 40 MG/1
40 TABLET ORAL 3 TIMES DAILY
Qty: 90 TABLET | Refills: 2 | Status: SHIPPED | OUTPATIENT
Start: 2024-08-29

## 2024-09-06 ENCOUNTER — PATIENT ROUNDING (BHMG ONLY) (OUTPATIENT)
Dept: NEUROLOGY | Facility: CLINIC | Age: 54
End: 2024-09-06
Payer: COMMERCIAL

## 2024-11-28 DIAGNOSIS — G44.001 INTRACTABLE CLUSTER HEADACHE SYNDROME, UNSPECIFIED CHRONICITY PATTERN: ICD-10-CM

## 2024-12-02 RX ORDER — VERAPAMIL HYDROCHLORIDE 40 MG/1
40 TABLET ORAL 3 TIMES DAILY
Qty: 270 TABLET | Refills: 0 | Status: SHIPPED | OUTPATIENT
Start: 2024-12-02

## 2025-02-12 ENCOUNTER — OFFICE VISIT (OUTPATIENT)
Dept: INTERNAL MEDICINE | Facility: CLINIC | Age: 55
End: 2025-02-12
Payer: COMMERCIAL

## 2025-02-12 VITALS
DIASTOLIC BLOOD PRESSURE: 84 MMHG | HEART RATE: 76 BPM | TEMPERATURE: 97.7 F | BODY MASS INDEX: 32.98 KG/M2 | HEIGHT: 74 IN | WEIGHT: 257 LBS | SYSTOLIC BLOOD PRESSURE: 132 MMHG | RESPIRATION RATE: 16 BRPM

## 2025-02-12 DIAGNOSIS — Z13.6 SCREENING FOR CARDIOVASCULAR CONDITION: ICD-10-CM

## 2025-02-12 DIAGNOSIS — Z00.00 PHYSICAL EXAM: Primary | ICD-10-CM

## 2025-02-12 DIAGNOSIS — Z12.5 SCREENING FOR PROSTATE CANCER: ICD-10-CM

## 2025-02-12 LAB
ALBUMIN SERPL-MCNC: 4.4 G/DL (ref 3.5–5.2)
ALBUMIN/GLOB SERPL: 1.8 G/DL
ALP SERPL-CCNC: 89 U/L (ref 39–117)
ALT SERPL W P-5'-P-CCNC: 32 U/L (ref 1–41)
ANION GAP SERPL CALCULATED.3IONS-SCNC: 11 MMOL/L (ref 5–15)
AST SERPL-CCNC: 27 U/L (ref 1–40)
BASOPHILS # BLD AUTO: 0.05 10*3/MM3 (ref 0–0.2)
BASOPHILS NFR BLD AUTO: 0.7 % (ref 0–1.5)
BILIRUB BLD-MCNC: NEGATIVE MG/DL
BILIRUB SERPL-MCNC: 0.5 MG/DL (ref 0–1.2)
BUN SERPL-MCNC: 16 MG/DL (ref 6–20)
BUN/CREAT SERPL: 15.7 (ref 7–25)
CALCIUM SPEC-SCNC: 9.4 MG/DL (ref 8.6–10.5)
CHLORIDE SERPL-SCNC: 101 MMOL/L (ref 98–107)
CHOLEST SERPL-MCNC: 214 MG/DL (ref 0–200)
CLARITY, POC: CLEAR
CO2 SERPL-SCNC: 29 MMOL/L (ref 22–29)
COLOR UR: YELLOW
CREAT SERPL-MCNC: 1.02 MG/DL (ref 0.76–1.27)
DEPRECATED RDW RBC AUTO: 37.7 FL (ref 37–54)
EGFRCR SERPLBLD CKD-EPI 2021: 87.9 ML/MIN/1.73
EOSINOPHIL # BLD AUTO: 0.23 10*3/MM3 (ref 0–0.4)
EOSINOPHIL NFR BLD AUTO: 3.4 % (ref 0.3–6.2)
ERYTHROCYTE [DISTWIDTH] IN BLOOD BY AUTOMATED COUNT: 12 % (ref 12.3–15.4)
EXPIRATION DATE: NORMAL
GLOBULIN UR ELPH-MCNC: 2.5 GM/DL
GLUCOSE SERPL-MCNC: 109 MG/DL (ref 65–99)
GLUCOSE UR STRIP-MCNC: NEGATIVE MG/DL
HCT VFR BLD AUTO: 43.3 % (ref 37.5–51)
HDLC SERPL-MCNC: 39 MG/DL (ref 40–60)
HGB BLD-MCNC: 15.2 G/DL (ref 13–17.7)
IMM GRANULOCYTES # BLD AUTO: 0.02 10*3/MM3 (ref 0–0.05)
IMM GRANULOCYTES NFR BLD AUTO: 0.3 % (ref 0–0.5)
KETONES UR QL: NEGATIVE
LDLC SERPL CALC-MCNC: 147 MG/DL (ref 0–100)
LDLC/HDLC SERPL: 3.71 {RATIO}
LEUKOCYTE EST, POC: NEGATIVE
LYMPHOCYTES # BLD AUTO: 2.18 10*3/MM3 (ref 0.7–3.1)
LYMPHOCYTES NFR BLD AUTO: 32.6 % (ref 19.6–45.3)
Lab: NORMAL
MCH RBC QN AUTO: 30.5 PG (ref 26.6–33)
MCHC RBC AUTO-ENTMCNC: 35.1 G/DL (ref 31.5–35.7)
MCV RBC AUTO: 86.8 FL (ref 79–97)
MONOCYTES # BLD AUTO: 0.51 10*3/MM3 (ref 0.1–0.9)
MONOCYTES NFR BLD AUTO: 7.6 % (ref 5–12)
NEUTROPHILS NFR BLD AUTO: 3.7 10*3/MM3 (ref 1.7–7)
NEUTROPHILS NFR BLD AUTO: 55.4 % (ref 42.7–76)
NITRITE UR-MCNC: NEGATIVE MG/ML
NRBC BLD AUTO-RTO: 0 /100 WBC (ref 0–0.2)
PH UR: 8 [PH] (ref 5–8)
PLATELET # BLD AUTO: 223 10*3/MM3 (ref 140–450)
PMV BLD AUTO: 9.7 FL (ref 6–12)
POTASSIUM SERPL-SCNC: 3.9 MMOL/L (ref 3.5–5.2)
PROT SERPL-MCNC: 6.9 G/DL (ref 6–8.5)
PROT UR STRIP-MCNC: NEGATIVE MG/DL
PSA SERPL-MCNC: 1.87 NG/ML (ref 0–4)
RBC # BLD AUTO: 4.99 10*6/MM3 (ref 4.14–5.8)
RBC # UR STRIP: NEGATIVE /UL
SODIUM SERPL-SCNC: 141 MMOL/L (ref 136–145)
SP GR UR: 1.01 (ref 1–1.03)
TRIGL SERPL-MCNC: 152 MG/DL (ref 0–150)
TSH SERPL DL<=0.05 MIU/L-ACNC: 2.59 UIU/ML (ref 0.27–4.2)
UROBILINOGEN UR QL: NORMAL
VLDLC SERPL-MCNC: 28 MG/DL (ref 5–40)
WBC NRBC COR # BLD AUTO: 6.69 10*3/MM3 (ref 3.4–10.8)

## 2025-02-12 PROCEDURE — 80053 COMPREHEN METABOLIC PANEL: CPT | Performed by: INTERNAL MEDICINE

## 2025-02-12 PROCEDURE — 99396 PREV VISIT EST AGE 40-64: CPT | Performed by: INTERNAL MEDICINE

## 2025-02-12 PROCEDURE — G0103 PSA SCREENING: HCPCS | Performed by: INTERNAL MEDICINE

## 2025-02-12 PROCEDURE — 36415 COLL VENOUS BLD VENIPUNCTURE: CPT | Performed by: INTERNAL MEDICINE

## 2025-02-12 PROCEDURE — 80061 LIPID PANEL: CPT | Performed by: INTERNAL MEDICINE

## 2025-02-12 PROCEDURE — 81003 URINALYSIS AUTO W/O SCOPE: CPT | Performed by: INTERNAL MEDICINE

## 2025-02-12 PROCEDURE — 85025 COMPLETE CBC W/AUTO DIFF WBC: CPT | Performed by: INTERNAL MEDICINE

## 2025-02-12 PROCEDURE — 84443 ASSAY THYROID STIM HORMONE: CPT | Performed by: INTERNAL MEDICINE

## 2025-02-12 RX ORDER — OXCARBAZEPINE 300 MG/1
300 TABLET, FILM COATED ORAL DAILY
COMMUNITY

## 2025-02-12 NOTE — PROGRESS NOTES
"Chief Complaint   Patient presents with    Annual Exam       History of Present Illness    The patient presents for an established patient physical examination and health maintenance visit.    Medications      Current Outpatient Medications:     lamoTRIgine (LaMICtal) 25 MG tablet, Take 2 tablets by mouth Every Night., Disp: , Rfl:     OXcarbazepine (TRILEPTAL) 300 MG tablet, Take 1 tablet by mouth Daily., Disp: , Rfl:     telmisartan (MICARDIS) 80 MG tablet, , Disp: , Rfl:     venlafaxine XR (EFFEXOR-XR) 75 MG 24 hr capsule, Take 1 capsule by mouth Every Morning., Disp: , Rfl:      Allergies    Allergies   Allergen Reactions    No Known Drug Allergy        Problem List    Patient Active Problem List   Diagnosis    Acute intractable headache       Medications, Allergies, Problems List and Past History were reviewed and updated.    Physical Examination    /84 (BP Location: Right arm, Patient Position: Sitting, Cuff Size: Adult)   Pulse 76   Temp 97.7 °F (36.5 °C) (Infrared)   Resp 16   Ht 187.3 cm (73.75\")   Wt 117 kg (257 lb)   BMI 33.22 kg/m²       HEENT: Head- Normocephalic Atraumatic. Facies- Within normal limits. Pinnas- Normal texture and shape bilaterally. Canals- Normal bilaterally. TMs- Normal bilaterally. Nares- Patent bilaterally. Nasal Septum- is normal. There is no tenderness to palpation over the frontal or maxillary sinuses. Lids- Normal bilaterally. Conjunctiva- Clear bilaterally. Sclera- Anicteric bilaterally. Oropharynx- Moist with no lesions. Tonsils- No enlargement, erythema or exudate.    Neck: Thyroid- non enlarged, symmetric and has no nodules. No bruits are detected. ROM- Normal Range of Motion with no rigidity.    Lungs: Auscultation- Clear to auscultation bilaterally. There are no retractions, clubbing or cyanosis. The Expiratory to Inspiratory ratio is equal.    Lymph Nodes: Cervical- no enlarged lymph nodes noted. Clavicular- Deferred. Axillary- Deferred. Inguinal- " Deferred.    Cardiovascular: There are no carotid bruits. Heart- Normal Rate with Regular rhythm and no murmurs. There are no gallops. There are no rubs. In the lower extremities there is no edema. The upper extremities do not have edema.    Abdomen: Soft, benign, non-tender with no masses, hernias, organomegaly or scars.    Male Genitourinary: The penis is circumcised with testicles found in the scrotum bilaterally. Testicular Size is normal bilaterally. The penis has no anatomic abnormalities.    Rectal: reveals normal external sphincter tone with no external lesions.    Prostate: The prostate gland is symmetric and smooth with no nodularity.    Dermatologic: The patient has no worrisome or suspicious skin lesions noted.    Impression and Assessment    Normal Physical Examination.    Encounter for Screening for Malignant Neoplasm of the Prostate.    Plan    Counseling was provided regarding: Adequate Aerobic Exercise, Dental Visits, Flossing Teeth, Heart Healthy Diet, Seat Belt Utilization, Toning Exercise and Weight Lose.    The following was ordered for screening and health maintenance: CBC w Automated Diff, CMP, Lipid Profile, PSA and U/A.       Immunizations Ordered and Administered: None.    Diagnoses and all orders for this visit:    1. Physical exam (Primary)  -     CBC & Differential; Future  -     Comprehensive Metabolic Panel; Future  -     TSH; Future  -     POC Urinalysis Dipstick, Automated; Future    2. Screening for prostate cancer  -     PSA Screen; Future    3. Screening for cardiovascular condition  -     Lipid Panel; Future      BMI is >= 30 and <35. (Class 1 Obesity). The following options were offered after discussion;: weight loss educational material (shared in after visit summary), exercise counseling/recommendations, nutrition counseling/recommendations, and Information on healthy weight added to patient's after visit summary.        Return to Office    The patient was instructed to return  for follow-up in 1 year. The patient was instructed to return sooner if the condition changes, worsens, or does not resolve.

## 2025-02-14 DIAGNOSIS — R73.9 BLOOD GLUCOSE ELEVATED: Primary | ICD-10-CM

## 2025-04-17 ENCOUNTER — LAB (OUTPATIENT)
Dept: INTERNAL MEDICINE | Facility: CLINIC | Age: 55
End: 2025-04-17
Payer: COMMERCIAL

## 2025-04-17 DIAGNOSIS — R73.9 BLOOD GLUCOSE ELEVATED: ICD-10-CM

## 2025-04-17 LAB — HBA1C MFR BLD: 5.8 % (ref 4.8–5.6)

## 2025-04-17 PROCEDURE — 83036 HEMOGLOBIN GLYCOSYLATED A1C: CPT | Performed by: INTERNAL MEDICINE

## 2025-04-17 PROCEDURE — 36415 COLL VENOUS BLD VENIPUNCTURE: CPT | Performed by: INTERNAL MEDICINE

## 2025-05-21 NOTE — TELEPHONE ENCOUNTER
"Left message voicemail for patient to please return call.  Ofc # given.       RELAY:\"Please verify dosage. This is not complete on his medication list.     Document response.   "

## 2025-05-22 RX ORDER — TELMISARTAN 80 MG/1
80 TABLET ORAL DAILY
Qty: 90 TABLET | Refills: 2 | Status: SHIPPED | OUTPATIENT
Start: 2025-05-22

## 2025-05-22 NOTE — TELEPHONE ENCOUNTER
Spoke with patient, states he is on Telmisartan 80 mg once daily.  Explained we will get it sent in for him.  Verbalized great appreciation.     Rx sent via erx.